# Patient Record
Sex: MALE | Race: WHITE | NOT HISPANIC OR LATINO | Employment: PART TIME | URBAN - METROPOLITAN AREA
[De-identification: names, ages, dates, MRNs, and addresses within clinical notes are randomized per-mention and may not be internally consistent; named-entity substitution may affect disease eponyms.]

---

## 2018-09-13 ENCOUNTER — OFFICE VISIT (OUTPATIENT)
Dept: INTERNAL MEDICINE | Facility: CLINIC | Age: 73
End: 2018-09-13

## 2018-09-13 DIAGNOSIS — I10 ESSENTIAL HYPERTENSION: ICD-10-CM

## 2018-09-13 DIAGNOSIS — Z23 NEED FOR INFLUENZA VACCINATION: Primary | ICD-10-CM

## 2018-09-13 PROCEDURE — 90662 IIV NO PRSV INCREASED AG IM: CPT | Performed by: NURSE PRACTITIONER

## 2018-09-13 PROCEDURE — G0008 ADMIN INFLUENZA VIRUS VAC: HCPCS | Performed by: NURSE PRACTITIONER

## 2018-09-13 PROCEDURE — 93000 ELECTROCARDIOGRAM COMPLETE: CPT | Performed by: NURSE PRACTITIONER

## 2018-09-13 PROCEDURE — 99203 OFFICE O/P NEW LOW 30 MIN: CPT | Performed by: NURSE PRACTITIONER

## 2018-09-13 RX ORDER — FLUTICASONE PROPIONATE 50 MCG
1 SPRAY, SUSPENSION (ML) NASAL AS NEEDED
COMMUNITY

## 2018-09-13 RX ORDER — LANOLIN ALCOHOL/MO/W.PET/CERES
1000 CREAM (GRAM) TOPICAL DAILY
COMMUNITY

## 2018-09-13 RX ORDER — MELATONIN
1000 DAILY
COMMUNITY
End: 2022-10-10

## 2018-09-13 RX ORDER — AMLODIPINE AND VALSARTAN 10; 320 MG/1; MG/1
1 TABLET ORAL DAILY
COMMUNITY
Start: 2018-08-21 | End: 2019-03-18 | Stop reason: SDUPTHER

## 2018-09-13 RX ORDER — DESONIDE 0.5 MG/G
1 OINTMENT TOPICAL AS NEEDED
COMMUNITY
End: 2022-04-11

## 2018-09-13 RX ORDER — FEXOFENADINE HCL 180 MG/1
180 TABLET ORAL AS NEEDED
COMMUNITY
End: 2019-03-18

## 2018-09-13 RX ORDER — ASPIRIN 81 MG/1
81 TABLET ORAL DAILY
COMMUNITY
End: 2019-03-18

## 2018-09-13 NOTE — PROGRESS NOTES
Subjective   Santy Burns is a 73 y.o. male. Patient is here today for   Chief Complaint   Patient presents with   • Hypertension     Pt here to follow up on HTN. Pt currently taking Amlodipine-Valsartan 10-320mg once daily x15 years.    • Immunizations     Pt requesting 65+ flu vaccine.    .    History of Present Illness   New patient here to establish care.  Health history and questionnaire have been reviewed in its entirety. The patient's previous primary care provider was Dr. Colon.   Patient is here to follow up on hypertension which is controlled with current medications. Denies chest pain, headaches. He does check his blood at home and it is usually 110-120s systolic. His previous PCP recently increased the dosage. He denies any problems with the medication.     Patient is requesting a flu shot today.    The following portions of the patient's history were reviewed and updated as appropriate: allergies, current medications, past family history, past medical history, past social history, past surgical history and problem list.    Review of Systems   Constitutional: Negative.    Respiratory: Negative.    Cardiovascular: Negative.    Psychiatric/Behavioral: The patient is nervous/anxious.        Objective   Vitals:    09/13/18 1100   BP: 130/90   Pulse:    SpO2:      Physical Exam   Constitutional: Vital signs are normal. He appears well-developed and well-nourished.   Cardiovascular: Normal rate, regular rhythm and normal heart sounds.    Pulmonary/Chest: Effort normal and breath sounds normal.   Skin: Skin is warm, dry and intact.   Psychiatric: He has a normal mood and affect. His speech is normal and behavior is normal. Thought content normal.   Nursing note and vitals reviewed.      ECG 12 Lead  Date/Time: 9/13/2018 11:00 AM  Performed by: PADDY WEBSTER  Authorized by: PADDY WEBSTER   Comparison: not compared with previous ECG   Previous ECG: no previous ECG available  Rhythm: sinus rhythm  Rate:  normal  BPM: 70  Conduction: conduction normal  ST Segments: ST segments normal  T Waves: T waves normal  QRS axis: normal  Other: no other findings  Clinical impression: normal ECG  Comments: OK interval 182 ms  QRS interval 92 ms  QTc interval 421 ms              Assessment/Plan   Santy was seen today for hypertension and immunizations.    Diagnoses and all orders for this visit:    Need for influenza vaccination  -     Flu Vaccine High Dose PF 65YR+ ([id number])    Essential hypertension  -     ECG 12 Lead    HTN - Alexander will continue on his current medication.  He did just have lab work done in August.  Results will be scanned in the patient's chart.  Follow up in 6 months for fasting labs and a follow up appointment.

## 2018-09-14 VITALS
DIASTOLIC BLOOD PRESSURE: 90 MMHG | WEIGHT: 145.5 LBS | HEART RATE: 76 BPM | SYSTOLIC BLOOD PRESSURE: 130 MMHG | BODY MASS INDEX: 22.84 KG/M2 | OXYGEN SATURATION: 99 % | HEIGHT: 67 IN

## 2018-11-01 ENCOUNTER — OFFICE VISIT (OUTPATIENT)
Dept: INTERNAL MEDICINE | Facility: CLINIC | Age: 73
End: 2018-11-01

## 2018-11-01 VITALS
HEIGHT: 67 IN | DIASTOLIC BLOOD PRESSURE: 64 MMHG | OXYGEN SATURATION: 99 % | SYSTOLIC BLOOD PRESSURE: 168 MMHG | HEART RATE: 67 BPM | WEIGHT: 145.38 LBS | BODY MASS INDEX: 22.82 KG/M2

## 2018-11-01 DIAGNOSIS — F41.9 ANXIETY: ICD-10-CM

## 2018-11-01 DIAGNOSIS — I10 ESSENTIAL HYPERTENSION: Primary | ICD-10-CM

## 2018-11-01 DIAGNOSIS — Z23 NEED FOR VACCINATION WITH 13-POLYVALENT PNEUMOCOCCAL CONJUGATE VACCINE: ICD-10-CM

## 2018-11-01 PROCEDURE — 90670 PCV13 VACCINE IM: CPT | Performed by: NURSE PRACTITIONER

## 2018-11-01 PROCEDURE — 90471 IMMUNIZATION ADMIN: CPT | Performed by: NURSE PRACTITIONER

## 2018-11-01 PROCEDURE — 99213 OFFICE O/P EST LOW 20 MIN: CPT | Performed by: NURSE PRACTITIONER

## 2018-11-01 NOTE — PROGRESS NOTES
Subjective   Santy Burns is a 73 y.o. male. Patient is here today for   Chief Complaint   Patient presents with   • Hypertension     Pt here to discuss HTN. Pt believes his BP readings have been running in the 150's.    .    History of Present Illness   Patient is here to follow up on his blood pressure. He wanted to make sure that he was taking his blood pressure correctly at home. For the most part, he is getting 120s/50s, but it has occasionally been as high as 150s systolic.   He is seeing his therapist and working on his anxiety, especially his anxiety that revolves around his blood pressure.     The following portions of the patient's history were reviewed and updated as appropriate: allergies, current medications, past family history, past medical history, past social history, past surgical history and problem list.    Review of Systems   Constitutional: Negative.    Respiratory: Negative.    Cardiovascular: Negative.    Psychiatric/Behavioral: The patient is nervous/anxious.        Objective   Vitals:    11/01/18 0814   BP: 168/64   Pulse: 67   SpO2: 99%     Physical Exam   Constitutional: Vital signs are normal. He appears well-developed and well-nourished.   Cardiovascular: Normal rate, regular rhythm and normal heart sounds.    No peripheral edema   Pulmonary/Chest: Effort normal and breath sounds normal.   Skin: Skin is warm, dry and intact.   Psychiatric: He has a normal mood and affect. His speech is normal and behavior is normal. Thought content normal.   Nursing note and vitals reviewed.      Assessment/Plan   Santy was seen today for hypertension.    Diagnoses and all orders for this visit:    Essential hypertension    Anxiety    Need for vaccination with 13-polyvalent pneumococcal conjugate vaccine  -     Pneumococcal Conjugate Vaccine 13-Valent All (PCV13)    Continue with current medications. Patient has a follow up appointment scheduled in March.

## 2018-12-12 ENCOUNTER — CLINICAL SUPPORT (OUTPATIENT)
Dept: INTERNAL MEDICINE | Facility: CLINIC | Age: 73
End: 2018-12-12

## 2018-12-12 DIAGNOSIS — Z23 NEED FOR HEPATITIS A VACCINATION: Primary | ICD-10-CM

## 2018-12-12 PROCEDURE — 90471 IMMUNIZATION ADMIN: CPT | Performed by: NURSE PRACTITIONER

## 2018-12-12 PROCEDURE — 90632 HEPA VACCINE ADULT IM: CPT | Performed by: NURSE PRACTITIONER

## 2019-02-05 ENCOUNTER — APPOINTMENT (OUTPATIENT)
Dept: CT IMAGING | Facility: HOSPITAL | Age: 74
End: 2019-02-05

## 2019-02-05 ENCOUNTER — HOSPITAL ENCOUNTER (EMERGENCY)
Facility: HOSPITAL | Age: 74
Discharge: HOME | End: 2019-02-05
Attending: EMERGENCY MEDICINE | Admitting: EMERGENCY MEDICINE

## 2019-02-05 VITALS
HEART RATE: 92 BPM | BODY MASS INDEX: 22.6 KG/M2 | RESPIRATION RATE: 14 BRPM | DIASTOLIC BLOOD PRESSURE: 71 MMHG | OXYGEN SATURATION: 97 % | HEIGHT: 67 IN | TEMPERATURE: 99.6 F | SYSTOLIC BLOOD PRESSURE: 126 MMHG | WEIGHT: 144 LBS

## 2019-02-05 DIAGNOSIS — N20.0 RIGHT KIDNEY STONE: Primary | ICD-10-CM

## 2019-02-05 LAB
ALBUMIN SERPL-MCNC: 4.4 G/DL (ref 3.5–5.2)
ALBUMIN/GLOB SERPL: 1.7 G/DL
ALP SERPL-CCNC: 80 U/L (ref 39–117)
ALT SERPL W P-5'-P-CCNC: 23 U/L (ref 1–41)
ANION GAP SERPL CALCULATED.3IONS-SCNC: 14.5 MMOL/L
AST SERPL-CCNC: 22 U/L (ref 1–40)
BACTERIA UR QL AUTO: ABNORMAL /HPF
BASOPHILS # BLD AUTO: 0.01 10*3/MM3 (ref 0–0.2)
BASOPHILS NFR BLD AUTO: 0.1 % (ref 0–1.5)
BILIRUB SERPL-MCNC: 0.4 MG/DL (ref 0.1–1.2)
BILIRUB UR QL STRIP: NEGATIVE
BUN BLD-MCNC: 35 MG/DL (ref 8–23)
BUN/CREAT SERPL: 27.3 (ref 7–25)
CALCIUM SPEC-SCNC: 9.6 MG/DL (ref 8.6–10.5)
CHLORIDE SERPL-SCNC: 106 MMOL/L (ref 98–107)
CLARITY UR: CLEAR
CO2 SERPL-SCNC: 21.5 MMOL/L (ref 22–29)
COLOR UR: YELLOW
CREAT BLD-MCNC: 1.28 MG/DL (ref 0.76–1.27)
DEPRECATED RDW RBC AUTO: 43.8 FL (ref 37–54)
EOSINOPHIL # BLD AUTO: 0.01 10*3/MM3 (ref 0–0.7)
EOSINOPHIL NFR BLD AUTO: 0.1 % (ref 0.3–6.2)
ERYTHROCYTE [DISTWIDTH] IN BLOOD BY AUTOMATED COUNT: 12.7 % (ref 11.5–14.5)
GFR SERPL CREATININE-BSD FRML MDRD: 55 ML/MIN/1.73
GLOBULIN UR ELPH-MCNC: 2.6 GM/DL
GLUCOSE BLD-MCNC: 168 MG/DL (ref 65–99)
GLUCOSE UR STRIP-MCNC: NEGATIVE MG/DL
HCT VFR BLD AUTO: 41.5 % (ref 40.4–52.2)
HGB BLD-MCNC: 13.8 G/DL (ref 13.7–17.6)
HGB UR QL STRIP.AUTO: ABNORMAL
HYALINE CASTS UR QL AUTO: ABNORMAL /LPF
IMM GRANULOCYTES # BLD AUTO: 0 10*3/MM3 (ref 0–0.03)
IMM GRANULOCYTES NFR BLD AUTO: 0 % (ref 0–0.5)
KETONES UR QL STRIP: NEGATIVE
LEUKOCYTE ESTERASE UR QL STRIP.AUTO: NEGATIVE
LIPASE SERPL-CCNC: 23 U/L (ref 13–60)
LYMPHOCYTES # BLD AUTO: 0.61 10*3/MM3 (ref 0.9–4.8)
LYMPHOCYTES NFR BLD AUTO: 6.4 % (ref 19.6–45.3)
MCH RBC QN AUTO: 31.4 PG (ref 27–32.7)
MCHC RBC AUTO-ENTMCNC: 33.3 G/DL (ref 32.6–36.4)
MCV RBC AUTO: 94.3 FL (ref 79.8–96.2)
MONOCYTES # BLD AUTO: 0.7 10*3/MM3 (ref 0.2–1.2)
MONOCYTES NFR BLD AUTO: 7.3 % (ref 5–12)
NEUTROPHILS # BLD AUTO: 8.26 10*3/MM3 (ref 1.9–8.1)
NEUTROPHILS NFR BLD AUTO: 86.1 % (ref 42.7–76)
NITRITE UR QL STRIP: NEGATIVE
PH UR STRIP.AUTO: <=5 [PH] (ref 5–8)
PLATELET # BLD AUTO: 198 10*3/MM3 (ref 140–500)
PMV BLD AUTO: 10 FL (ref 6–12)
POTASSIUM BLD-SCNC: 4.6 MMOL/L (ref 3.5–5.2)
PROT SERPL-MCNC: 7 G/DL (ref 6–8.5)
PROT UR QL STRIP: NEGATIVE
RBC # BLD AUTO: 4.4 10*6/MM3 (ref 4.6–6)
RBC # UR: ABNORMAL /HPF
REF LAB TEST METHOD: ABNORMAL
SODIUM BLD-SCNC: 142 MMOL/L (ref 136–145)
SP GR UR STRIP: 1.02 (ref 1–1.03)
SQUAMOUS #/AREA URNS HPF: ABNORMAL /HPF
UROBILINOGEN UR QL STRIP: ABNORMAL
WBC NRBC COR # BLD: 9.59 10*3/MM3 (ref 4.5–10.7)
WBC UR QL AUTO: ABNORMAL /HPF

## 2019-02-05 PROCEDURE — 83690 ASSAY OF LIPASE: CPT | Performed by: EMERGENCY MEDICINE

## 2019-02-05 PROCEDURE — 96374 THER/PROPH/DIAG INJ IV PUSH: CPT

## 2019-02-05 PROCEDURE — 96375 TX/PRO/DX INJ NEW DRUG ADDON: CPT

## 2019-02-05 PROCEDURE — 85025 COMPLETE CBC W/AUTO DIFF WBC: CPT | Performed by: EMERGENCY MEDICINE

## 2019-02-05 PROCEDURE — 99284 EMERGENCY DEPT VISIT MOD MDM: CPT

## 2019-02-05 PROCEDURE — 81001 URINALYSIS AUTO W/SCOPE: CPT | Performed by: EMERGENCY MEDICINE

## 2019-02-05 PROCEDURE — 74176 CT ABD & PELVIS W/O CONTRAST: CPT

## 2019-02-05 PROCEDURE — 25010000002 KETOROLAC TROMETHAMINE PER 15 MG: Performed by: EMERGENCY MEDICINE

## 2019-02-05 PROCEDURE — 80053 COMPREHEN METABOLIC PANEL: CPT | Performed by: EMERGENCY MEDICINE

## 2019-02-05 PROCEDURE — 25010000002 ONDANSETRON PER 1 MG: Performed by: EMERGENCY MEDICINE

## 2019-02-05 RX ORDER — TAMSULOSIN HYDROCHLORIDE 0.4 MG/1
1 CAPSULE ORAL DAILY
Qty: 5 CAPSULE | Refills: 0 | Status: SHIPPED | OUTPATIENT
Start: 2019-02-05 | End: 2019-03-18

## 2019-02-05 RX ORDER — KETOROLAC TROMETHAMINE 15 MG/ML
15 INJECTION, SOLUTION INTRAMUSCULAR; INTRAVENOUS ONCE
Status: COMPLETED | OUTPATIENT
Start: 2019-02-05 | End: 2019-02-05

## 2019-02-05 RX ORDER — ONDANSETRON 2 MG/ML
4 INJECTION INTRAMUSCULAR; INTRAVENOUS ONCE
Status: COMPLETED | OUTPATIENT
Start: 2019-02-05 | End: 2019-02-05

## 2019-02-05 RX ORDER — OXYCODONE HYDROCHLORIDE AND ACETAMINOPHEN 5; 325 MG/1; MG/1
1 TABLET ORAL EVERY 6 HOURS PRN
Qty: 20 TABLET | Refills: 0 | Status: SHIPPED | OUTPATIENT
Start: 2019-02-05 | End: 2019-03-18

## 2019-02-05 RX ADMIN — ONDANSETRON 4 MG: 2 INJECTION INTRAMUSCULAR; INTRAVENOUS at 08:13

## 2019-02-05 RX ADMIN — SODIUM CHLORIDE 1000 ML: 9 INJECTION, SOLUTION INTRAVENOUS at 08:13

## 2019-02-05 RX ADMIN — KETOROLAC TROMETHAMINE 15 MG: 15 INJECTION, SOLUTION INTRAMUSCULAR; INTRAVENOUS at 08:13

## 2019-02-05 NOTE — ED PROVIDER NOTES
" EMERGENCY DEPARTMENT ENCOUNTER    Room Number:  10/10  PCP: Jordana Goss APRN   Urologist: Dr. Villeda  Historian: Patient, Family      HPI  Chief Complaint: Flank Pain  Context: Santy Burns is a 74 y.o. male with h/o nephrolithiasis who presents to the ED c/o constant right flank pain radiating to the right testicle and RLQ of the abdomen onset last night. Pt reports that his flank pain was abrupt in onset. Pt reports that he has been unable to find a position of comfort. Pt states that he has also had nausea and vomiting, but denies diarrhea, chest pain, dyspnea, dysuria, gross hematuria, penile/scrotal swelling, and documented fever. Pt reports that his current flank pain is similar to his pain with his prior ureteral stones. There are no other complaints at this time.        Pain Location: Right flank   Radiation: Right testicle, RLQ of the abdomen  Character: \"aching\"  Duration: Onset last night  Severity: Moderate  Progression: Waxing and waning  Aggravating Factors: Nothing  Alleviating Factors: Nothing        PAST MEDICAL HISTORY  Active Ambulatory Problems     Diagnosis Date Noted   • Hypertension    • Anxiety    • Colon polyp    • Kidney stones      Resolved Ambulatory Problems     Diagnosis Date Noted   • No Resolved Ambulatory Problems     Past Medical History:   Diagnosis Date   • Anxiety    • Colon polyp    • Hypertension    • Kidney stones          PAST SURGICAL HISTORY  Past Surgical History:   Procedure Laterality Date   • MENISCECTOMY      1964   • SINUS SURGERY           FAMILY HISTORY  Family History   Problem Relation Age of Onset   • Hypertension Mother    • Kidney cancer Mother    • Heart attack Father    • Hypertension Father    • No Known Problems Brother          SOCIAL HISTORY  Social History     Socioeconomic History   • Marital status:      Spouse name: Not on file   • Number of children: Not on file   • Years of education: Not on file   • Highest education level: Not on " file   Social Needs   • Financial resource strain: Not on file   • Food insecurity - worry: Not on file   • Food insecurity - inability: Not on file   • Transportation needs - medical: Not on file   • Transportation needs - non-medical: Not on file   Occupational History   • Not on file   Tobacco Use   • Smoking status: Never Smoker   • Smokeless tobacco: Never Used   Substance and Sexual Activity   • Alcohol use: Yes   • Drug use: No   • Sexual activity: Not on file   Other Topics Concern   • Not on file   Social History Narrative   • Not on file         ALLERGIES  Patient has no known allergies.        REVIEW OF SYSTEMS  Review of Systems   Constitutional: Negative for chills and fever.   HENT: Negative for congestion, rhinorrhea and sore throat.    Eyes: Negative for pain.   Respiratory: Negative for cough and shortness of breath.    Cardiovascular: Negative for chest pain, palpitations and leg swelling.   Gastrointestinal: Positive for abdominal pain (associated with flank pain), nausea and vomiting. Negative for diarrhea.   Genitourinary: Positive for flank pain (right-sided; radiating to the testicle and abdomen) and testicular pain (associated with flank pain). Negative for difficulty urinating, dysuria, frequency, hematuria, penile swelling and scrotal swelling.   Musculoskeletal: Negative for myalgias, neck pain and neck stiffness.   Skin: Negative for rash.   Neurological: Negative for dizziness, speech difficulty, weakness, light-headedness, numbness and headaches.   Psychiatric/Behavioral: Negative.    All other systems reviewed and are negative.           PHYSICAL EXAM  ED Triage Vitals   Temp Heart Rate Resp BP SpO2   02/05/19 0752 02/05/19 0752 02/05/19 0752 02/05/19 0801 02/05/19 0752   99.6 °F (37.6 °C) 107 16 168/80 99 %      Temp src Heart Rate Source Patient Position BP Location FiO2 (%)   -- 02/05/19 0801 02/05/19 0801 02/05/19 0801 --    Monitor Lying Right arm          Physical Exam    Constitutional: He is oriented to person, place, and time. No distress.   HENT:   Head: Normocephalic.   Mouth/Throat: Mucous membranes are normal.   Eyes: EOM are normal. Pupils are equal, round, and reactive to light.   Neck: Normal range of motion. Neck supple.   Cardiovascular: Normal rate, regular rhythm and normal heart sounds.   Pulmonary/Chest: Effort normal and breath sounds normal. No respiratory distress. He has no decreased breath sounds. He has no wheezes. He has no rhonchi. He has no rales.   Abdominal: Soft. There is no tenderness. There is no rebound, no guarding and no CVA tenderness.   Musculoskeletal: Normal range of motion.   Neurological: He is alert and oriented to person, place, and time. He has normal sensation.   Skin: Skin is warm and dry.   Psychiatric: Mood and affect normal.   Nursing note and vitals reviewed.          LAB RESULTS  Recent Results (from the past 24 hour(s))   Comprehensive Metabolic Panel    Collection Time: 02/05/19  8:09 AM   Result Value Ref Range    Glucose 168 (H) 65 - 99 mg/dL    BUN 35 (H) 8 - 23 mg/dL    Creatinine 1.28 (H) 0.76 - 1.27 mg/dL    Sodium 142 136 - 145 mmol/L    Potassium 4.6 3.5 - 5.2 mmol/L    Chloride 106 98 - 107 mmol/L    CO2 21.5 (L) 22.0 - 29.0 mmol/L    Calcium 9.6 8.6 - 10.5 mg/dL    Total Protein 7.0 6.0 - 8.5 g/dL    Albumin 4.40 3.50 - 5.20 g/dL    ALT (SGPT) 23 1 - 41 U/L    AST (SGOT) 22 1 - 40 U/L    Alkaline Phosphatase 80 39 - 117 U/L    Total Bilirubin 0.4 0.1 - 1.2 mg/dL    eGFR Non African Amer 55 (L) >60 mL/min/1.73    Globulin 2.6 gm/dL    A/G Ratio 1.7 g/dL    BUN/Creatinine Ratio 27.3 (H) 7.0 - 25.0    Anion Gap 14.5 mmol/L   Urinalysis With Microscopic If Indicated (No Culture) - Urine, Clean Catch    Collection Time: 02/05/19  8:09 AM   Result Value Ref Range    Color, UA Yellow Yellow, Straw    Appearance, UA Clear Clear    pH, UA <=5.0 5.0 - 8.0    Specific Gravity, UA 1.024 1.005 - 1.030    Glucose, UA Negative Negative     Ketones, UA Negative Negative    Bilirubin, UA Negative Negative    Blood, UA Large (3+) (A) Negative    Protein, UA Negative Negative    Leuk Esterase, UA Negative Negative    Nitrite, UA Negative Negative    Urobilinogen, UA 0.2 E.U./dL 0.2 - 1.0 E.U./dL   Lipase    Collection Time: 02/05/19  8:09 AM   Result Value Ref Range    Lipase 23 13 - 60 U/L   CBC Auto Differential    Collection Time: 02/05/19  8:09 AM   Result Value Ref Range    WBC 9.59 4.50 - 10.70 10*3/mm3    RBC 4.40 (L) 4.60 - 6.00 10*6/mm3    Hemoglobin 13.8 13.7 - 17.6 g/dL    Hematocrit 41.5 40.4 - 52.2 %    MCV 94.3 79.8 - 96.2 fL    MCH 31.4 27.0 - 32.7 pg    MCHC 33.3 32.6 - 36.4 g/dL    RDW 12.7 11.5 - 14.5 %    RDW-SD 43.8 37.0 - 54.0 fl    MPV 10.0 6.0 - 12.0 fL    Platelets 198 140 - 500 10*3/mm3    Neutrophil % 86.1 (H) 42.7 - 76.0 %    Lymphocyte % 6.4 (L) 19.6 - 45.3 %    Monocyte % 7.3 5.0 - 12.0 %    Eosinophil % 0.1 (L) 0.3 - 6.2 %    Basophil % 0.1 0.0 - 1.5 %    Immature Grans % 0.0 0.0 - 0.5 %    Neutrophils, Absolute 8.26 (H) 1.90 - 8.10 10*3/mm3    Lymphocytes, Absolute 0.61 (L) 0.90 - 4.80 10*3/mm3    Monocytes, Absolute 0.70 0.20 - 1.20 10*3/mm3    Eosinophils, Absolute 0.01 0.00 - 0.70 10*3/mm3    Basophils, Absolute 0.01 0.00 - 0.20 10*3/mm3    Immature Grans, Absolute 0.00 0.00 - 0.03 10*3/mm3   Urinalysis, Microscopic Only - Urine, Clean Catch    Collection Time: 02/05/19  8:09 AM   Result Value Ref Range    RBC, UA Too Numerous to Count (A) None Seen, 0-2 /HPF    WBC, UA 0-2 None Seen, 0-2 /HPF    Bacteria, UA None Seen None Seen /HPF    Squamous Epithelial Cells, UA 0-2 None Seen, 0-2 /HPF    Hyaline Casts, UA 0-2 None Seen /LPF    Methodology Automated Microscopy        Ordered the above labs and reviewed the results.        RADIOLOGY  CT Abdomen Pelvis Without Contrast   Preliminary Result   There is a 6 mm stone within the proximal right ureter   approximately 5 cm distal to the UPJ with moderately large    hydroureteronephrosis.       Discussed with Dr. Sarmiento.               Ordered the above noted radiological studies. Reviewed by me in PACS.  Spoke with Dr. Snyder (radiologist) regarding CT Abd scan results.          PROCEDURES  Procedures          MEDICATIONS GIVEN IN ER  Medications   sodium chloride 0.9 % bolus 1,000 mL (0 mL Intravenous Stopped 2/5/19 1015)   ketorolac (TORADOL) injection 15 mg (15 mg Intravenous Given 2/5/19 0813)   ondansetron (ZOFRAN) injection 4 mg (4 mg Intravenous Given 2/5/19 0813)             PROGRESS AND CONSULTS  ED Course as of Feb 05 1055   Tue Feb 05, 2019   1020 10:20 AM  Patient with right flank pain and appears to have 6mm stone.  Moderate hydro.  Discussed with Dr. Jones who will see in the office now.  Urine strainer.  Percocet, flomax.  Has been given fluids for elevated BUN.  [SL]      ED Course User Index  [SL] Jose Sarmiento MD       8:05 AM:  UA and blood work ordered for further evaluation. Toradol and Zofran ordered to treat for flank pain and nausea. IV fluids ordered to hydrate pt.     8:54 AM:  CT Abd ordered for further evaluation.     9:32 AM:  Rechecked pt. Informed pt that his CT Abd shows a 6 mm stone within the proximal right ureter about 5 cm distal to the UPJ. Pt's creatinine is 1.28. Will discuss further course of care with the urologist. Pt agrees with plan.      9:34 AM:  Placed call to the urologist to discuss further course of care.     10:10 AM:  Discussed the case with Dr. Jones, urologist. He would like pt discharged from the ER. He will f/u with pt in his office immediately afterwards.     10:13 AM:  Rechecked pt. Informed pt of my d/w Dr. Jones, urologist on-call for Dr. Villeda, who will f/u with pt immediately after pt is discharged from the ER today -> Pt was strongly advised to f/u as scheduled. Pt will be provided with a urine strainer. Pt will be prescribed with rx for small amount of pain medicine for discomfort and  Flomax. Strict RTER warnings given. Pt agrees with plan for discharge.     Pt's ALFREDO was queried - It is negative.      MEDICAL DECISION MAKING      MDM  Number of Diagnoses or Management Options     Amount and/or Complexity of Data Reviewed  Clinical lab tests: ordered and reviewed (UA results reviewed.)  Tests in the radiology section of CPT®: ordered and reviewed (CT Abd:  There is a 6 mm stone within the proximal right ureter  approximately 5 cm distal to the UPJ with moderately large  hydroureteronephrosis.)  Discussion of test results with the performing providers: yes (CT Abd results d/w radiologist.   )  Discuss the patient with other providers: yes (Discussed the case with Dr. Jones, urologist.  )    Patient Progress  Patient progress: stable             DIAGNOSIS  Final diagnoses:   Right kidney stone           DISPOSITION  Pt discharged.    DISCHARGE    Patient discharged in stable condition.    Reviewed implications of results, diagnosis, meds, responsibility to follow up, warning signs and symptoms of possible worsening, potential complications and reasons to return to ER.    Patient/Family voiced understanding of above instructions.    Discussed plan for discharge, as there is no emergent indication for admission. Pt/family is agreeable and understands need for follow up and repeat testing. Pt is aware that discharge does not mean that nothing is wrong but it indicates no emergency is present that requires admission and they must continue care with follow-up as given below or physician of their choice.     FOLLOW-UP  Yash Jones MD  [address] [place] [address] [place] [id number] [phone]    Go today           Medication List      New Prescriptions    oxyCODONE-acetaminophen 5-325 MG per tablet  Commonly known as:  PERCOCET  Take 1 tablet by mouth Every 6 (Six) Hours As Needed for Moderate Pain .     tamsulosin 0.4 MG capsule 24 hr capsule  Commonly known as:  FLOMAX  Take 1  capsule by mouth Daily.            Latest Documented Vital Signs:  As of 10:38 AM  BP- 126/71 HR- 92 Temp- 99.6 °F (37.6 °C) O2 sat- 97%        --  Documentation assistance provided by muriel Serrano for Dr. Guillermina MD.  Information recorded by the scribe was done at my direction and has been verified and validated by me.             Jeremy Serrano  02/05/19 1055       Jose Sarmiento MD  02/05/19 1400

## 2019-02-05 NOTE — ED TRIAGE NOTES
Pt c/o nausea, right testicle and right flank pain onset last night and two episodes of vomiting. Pt states he currently has a stone in kidney waiting to pass. Pt reports hx of kidney stones. Denies hematuria, denies testicular swelling. Urologist-Dr. Villeda

## 2019-03-11 DIAGNOSIS — I10 ESSENTIAL HYPERTENSION: ICD-10-CM

## 2019-03-11 DIAGNOSIS — Z00.00 HEALTH CARE MAINTENANCE: ICD-10-CM

## 2019-03-11 DIAGNOSIS — R35.1 NOCTURIA: Primary | ICD-10-CM

## 2019-03-13 LAB
ALBUMIN SERPL-MCNC: 4.4 G/DL (ref 3.5–5.2)
ALBUMIN/GLOB SERPL: 2.1 G/DL
ALP SERPL-CCNC: 77 U/L (ref 39–117)
ALT SERPL-CCNC: 14 U/L (ref 1–41)
AST SERPL-CCNC: 16 U/L (ref 1–40)
BASOPHILS # BLD AUTO: 0.04 10*3/MM3 (ref 0–0.2)
BASOPHILS NFR BLD AUTO: 0.8 % (ref 0–1.5)
BILIRUB SERPL-MCNC: 0.5 MG/DL (ref 0.1–1.2)
BUN SERPL-MCNC: 17 MG/DL (ref 8–23)
BUN/CREAT SERPL: 18.1 (ref 7–25)
CALCIUM SERPL-MCNC: 10 MG/DL (ref 8.6–10.5)
CHLORIDE SERPL-SCNC: 106 MMOL/L (ref 98–107)
CHOLEST SERPL-MCNC: 191 MG/DL (ref 0–200)
CHOLEST/HDLC SERPL: 2.69 {RATIO}
CO2 SERPL-SCNC: 27.7 MMOL/L (ref 22–29)
CREAT SERPL-MCNC: 0.94 MG/DL (ref 0.76–1.27)
EOSINOPHIL # BLD AUTO: 0.2 10*3/MM3 (ref 0–0.4)
EOSINOPHIL NFR BLD AUTO: 3.9 % (ref 0.3–6.2)
ERYTHROCYTE [DISTWIDTH] IN BLOOD BY AUTOMATED COUNT: 13.2 % (ref 12.3–15.4)
GLOBULIN SER CALC-MCNC: 2.1 GM/DL
GLUCOSE SERPL-MCNC: 103 MG/DL (ref 65–99)
HCT VFR BLD AUTO: 43.4 % (ref 37.5–51)
HDLC SERPL-MCNC: 71 MG/DL (ref 40–60)
HGB BLD-MCNC: 13.6 G/DL (ref 13–17.7)
IMM GRANULOCYTES # BLD AUTO: 0.04 10*3/MM3 (ref 0–0.05)
IMM GRANULOCYTES NFR BLD AUTO: 0.8 % (ref 0–0.5)
LDLC SERPL CALC-MCNC: 102 MG/DL (ref 0–100)
LYMPHOCYTES # BLD AUTO: 1.48 10*3/MM3 (ref 0.7–3.1)
LYMPHOCYTES NFR BLD AUTO: 29 % (ref 19.6–45.3)
MCH RBC QN AUTO: 30.3 PG (ref 26.6–33)
MCHC RBC AUTO-ENTMCNC: 31.3 G/DL (ref 31.5–35.7)
MCV RBC AUTO: 96.7 FL (ref 79–97)
MONOCYTES # BLD AUTO: 0.52 10*3/MM3 (ref 0.1–0.9)
MONOCYTES NFR BLD AUTO: 10.2 % (ref 5–12)
NEUTROPHILS # BLD AUTO: 2.82 10*3/MM3 (ref 1.4–7)
NEUTROPHILS NFR BLD AUTO: 55.3 % (ref 42.7–76)
NRBC BLD AUTO-RTO: 0 /100 WBC (ref 0–0)
PLATELET # BLD AUTO: 180 10*3/MM3 (ref 140–450)
POTASSIUM SERPL-SCNC: 4.5 MMOL/L (ref 3.5–5.2)
PROT SERPL-MCNC: 6.5 G/DL (ref 6–8.5)
PSA SERPL-MCNC: 3.72 NG/ML (ref 0–4)
RBC # BLD AUTO: 4.49 10*6/MM3 (ref 4.14–5.8)
SODIUM SERPL-SCNC: 143 MMOL/L (ref 136–145)
TRIGL SERPL-MCNC: 90 MG/DL (ref 0–150)
VLDLC SERPL CALC-MCNC: 18 MG/DL (ref 5–40)
WBC # BLD AUTO: 5.1 10*3/MM3 (ref 3.4–10.8)

## 2019-03-14 LAB
HBA1C MFR BLD: 5.52 % (ref 4.8–5.6)
Lab: NORMAL
WRITTEN AUTHORIZATION: NORMAL

## 2019-03-18 ENCOUNTER — OFFICE VISIT (OUTPATIENT)
Dept: INTERNAL MEDICINE | Facility: CLINIC | Age: 74
End: 2019-03-18

## 2019-03-18 VITALS
BODY MASS INDEX: 22.81 KG/M2 | HEART RATE: 62 BPM | OXYGEN SATURATION: 99 % | DIASTOLIC BLOOD PRESSURE: 62 MMHG | WEIGHT: 145.31 LBS | SYSTOLIC BLOOD PRESSURE: 152 MMHG | HEIGHT: 67 IN

## 2019-03-18 DIAGNOSIS — I10 ESSENTIAL HYPERTENSION: ICD-10-CM

## 2019-03-18 DIAGNOSIS — Z00.00 HEALTHCARE MAINTENANCE: Primary | ICD-10-CM

## 2019-03-18 DIAGNOSIS — R73.01 ELEVATED FASTING GLUCOSE: ICD-10-CM

## 2019-03-18 PROCEDURE — 99213 OFFICE O/P EST LOW 20 MIN: CPT | Performed by: NURSE PRACTITIONER

## 2019-03-18 PROCEDURE — 99397 PER PM REEVAL EST PAT 65+ YR: CPT | Performed by: NURSE PRACTITIONER

## 2019-03-18 RX ORDER — TRIAMCINOLONE ACETONIDE OINTMENT USP, 0.05% 0.5 MG/G
1 OINTMENT TOPICAL AS NEEDED
COMMUNITY

## 2019-03-18 RX ORDER — AMLODIPINE AND VALSARTAN 10; 320 MG/1; MG/1
1 TABLET ORAL DAILY
Qty: 90 TABLET | Refills: 3 | Status: SHIPPED | OUTPATIENT
Start: 2019-03-18 | End: 2020-02-11 | Stop reason: SDUPTHER

## 2019-04-02 PROBLEM — R73.01 ELEVATED FASTING GLUCOSE: Status: ACTIVE | Noted: 2019-04-02

## 2019-07-19 ENCOUNTER — OFFICE VISIT (OUTPATIENT)
Dept: INTERNAL MEDICINE | Facility: CLINIC | Age: 74
End: 2019-07-19

## 2019-07-19 ENCOUNTER — HOSPITAL ENCOUNTER (OUTPATIENT)
Dept: GENERAL RADIOLOGY | Facility: HOSPITAL | Age: 74
Discharge: HOME | End: 2019-07-19
Admitting: NURSE PRACTITIONER

## 2019-07-19 VITALS
BODY MASS INDEX: 22.84 KG/M2 | OXYGEN SATURATION: 98 % | SYSTOLIC BLOOD PRESSURE: 152 MMHG | HEART RATE: 72 BPM | DIASTOLIC BLOOD PRESSURE: 66 MMHG | WEIGHT: 145.5 LBS | HEIGHT: 67 IN

## 2019-07-19 DIAGNOSIS — M54.50 LUMBAR BACK PAIN: ICD-10-CM

## 2019-07-19 DIAGNOSIS — M51.26 LUMBAR HERNIATED DISC: ICD-10-CM

## 2019-07-19 DIAGNOSIS — M54.50 LUMBAR BACK PAIN: Primary | ICD-10-CM

## 2019-07-19 PROCEDURE — 99213 OFFICE O/P EST LOW 20 MIN: CPT | Performed by: NURSE PRACTITIONER

## 2019-07-19 PROCEDURE — 72100 X-RAY EXAM L-S SPINE 2/3 VWS: CPT

## 2019-07-19 NOTE — PROGRESS NOTES
Subjective   Santy Burns is a 74 y.o. male. Patient is here today for   Chief Complaint   Patient presents with   • Back Pain     Pt complains of having very low back pain that radiates to bilateral glutes & right sciatic area x8 weeks.   .    History of Present Illness   C/o low back pain x 8 weeks associated with pain that radiates down his legs. He has done some stretches for sciatica which hasn't helped much.   Walking helps. He has tried advil which has helped some. Denies any injury, but he had been bending over doing yard work when it started.  Deneis any numbness or tingling in his legs.    The following portions of the patient's history were reviewed and updated as appropriate: allergies, current medications, past family history, past medical history, past social history, past surgical history and problem list.    Review of Systems   Constitutional: Negative.    Respiratory: Negative.    Cardiovascular: Negative.    Musculoskeletal: Positive for back pain.       Objective   Vitals:    07/19/19 1103   BP: 152/66   Pulse: 72   SpO2: 98%   Patient has checked his BP at home and it has been 120s/60-70s.     Physical Exam   Constitutional: Vital signs are normal. He appears well-developed and well-nourished.   Cardiovascular: Normal rate, regular rhythm and normal heart sounds.   Pulmonary/Chest: Effort normal and breath sounds normal.   Musculoskeletal:        Lumbar back: He exhibits normal range of motion, no tenderness and no spasm.   Bilateral straight leg raise negative   Skin: Skin is warm, dry and intact.   Psychiatric: He has a normal mood and affect. His speech is normal and behavior is normal. Thought content normal.   Nursing note and vitals reviewed.      Assessment/Plan   Santy was seen today for back pain.    Diagnoses and all orders for this visit:    Lumbar back pain  -     XR Spine Lumbar 2 or 3 View; Future    Patient may benefit from physical therapy.  Will wait for x-ray  results.

## 2019-09-27 ENCOUNTER — OFFICE VISIT (OUTPATIENT)
Dept: INTERNAL MEDICINE | Facility: CLINIC | Age: 74
End: 2019-09-27

## 2019-09-27 VITALS
SYSTOLIC BLOOD PRESSURE: 160 MMHG | HEIGHT: 67 IN | BODY MASS INDEX: 22.6 KG/M2 | WEIGHT: 144 LBS | HEART RATE: 72 BPM | OXYGEN SATURATION: 99 % | DIASTOLIC BLOOD PRESSURE: 68 MMHG

## 2019-09-27 DIAGNOSIS — I10 ESSENTIAL HYPERTENSION: Primary | ICD-10-CM

## 2019-09-27 DIAGNOSIS — Z23 NEED FOR INFLUENZA VACCINATION: ICD-10-CM

## 2019-09-27 PROCEDURE — 90662 IIV NO PRSV INCREASED AG IM: CPT | Performed by: NURSE PRACTITIONER

## 2019-09-27 PROCEDURE — 90471 IMMUNIZATION ADMIN: CPT | Performed by: NURSE PRACTITIONER

## 2019-09-27 PROCEDURE — 99213 OFFICE O/P EST LOW 20 MIN: CPT | Performed by: NURSE PRACTITIONER

## 2019-09-27 NOTE — PROGRESS NOTES
Subjective   Santy Burns is a 74 y.o. male. Patient is here today for   Chief Complaint   Patient presents with   • Hypertension     Pt here to follow up on HTN.    .    History of Present Illness   Patient is here to follow up on hypertension which is controlled with current medications. Denies chest pain, headaches.   He checks it at at home with a manual cuff and it ranges from 118-144/50-60s.  States that he always gets nervous at the doctor's office.    Patient c/o tingling in bilateral forearms that comes and goes.  This is been going on for years.  He was told in the past by previous doctor that could be due to the degenerative disc disease that he has in his neck.  Denies any numbness or tingling in his hands.  Denies any decreased strength in his arms or hands.    The following portions of the patient's history were reviewed and updated as appropriate: allergies, current medications, past family history, past medical history, past social history, past surgical history and problem list.    Review of Systems   Constitutional: Negative.    Respiratory: Negative.    Cardiovascular: Negative.    Psychiatric/Behavioral: The patient is nervous/anxious.        Objective   Vitals:    09/27/19 1118   BP: 160/68   Pulse: 72   SpO2: 99%     Physical Exam   Constitutional: Vital signs are normal. He appears well-developed and well-nourished.   Cardiovascular: Normal rate, regular rhythm and normal heart sounds.   Pulses:       Radial pulses are 2+ on the right side, and 2+ on the left side.   Pulmonary/Chest: Effort normal and breath sounds normal.   Skin: Skin is warm, dry and intact.   Psychiatric: His speech is normal and behavior is normal. Thought content normal. His mood appears anxious (slight tremor in bilateral hands).   Nursing note and vitals reviewed.      Assessment/Plan   Santy was seen today for hypertension.    Diagnoses and all orders for this visit:    Essential hypertension      Current  Outpatient Medications:   •  amLODIPine-valsartan (EXFORGE)  MG per tablet, Take 1 tablet by mouth Daily., Disp: 90 tablet, Rfl: 3  •  Calcium Carb-Cholecalciferol (CALCIUM 1000 + D PO), Take 1 tablet by mouth Daily., Disp: , Rfl:   •  cholecalciferol (VITAMIN D3) 1000 units tablet, Take 1,000 Units by mouth Daily., Disp: , Rfl:   •  desonide (DESOWEN) 0.05 % ointment, Apply 1 application topically to the appropriate area as directed As Needed., Disp: , Rfl:   •  fluticasone (FLONASE) 50 MCG/ACT nasal spray, 1 spray into the nostril(s) as directed by provider As Needed for Rhinitis., Disp: , Rfl:   •  Triamcinolone Acetonide 0.05 % ointment, Apply 1 application topically As Needed., Disp: , Rfl:   •  vitamin B-12 (CYANOCOBALAMIN) 1000 MCG tablet, Take 1,000 mcg by mouth Daily., Disp: , Rfl:       HTN - Patient will continue current medication.  Follow-up in 6 months for physical and fasting labs    Tingling in forearms - continue to monitor for now. May need nerve study in the future if his symptoms worsen

## 2019-12-23 ENCOUNTER — AMBULATORY SURGICAL CENTER (OUTPATIENT)
Dept: URBAN - METROPOLITAN AREA SURGERY 20 | Facility: SURGERY | Age: 74
End: 2019-12-23
Payer: COMMERCIAL

## 2019-12-23 DIAGNOSIS — K64.1 SECOND DEGREE HEMORRHOIDS: ICD-10-CM

## 2019-12-23 DIAGNOSIS — Z12.11 ENCOUNTER FOR SCREENING FOR MALIGNANT NEOPLASM OF COLON: ICD-10-CM

## 2019-12-23 DIAGNOSIS — K57.30 DIVERTICULOSIS OF LARGE INTESTINE WITHOUT PERFORATION OR ABS: ICD-10-CM

## 2019-12-23 PROCEDURE — 45378 DIAGNOSTIC COLONOSCOPY: CPT | Mod: 33 | Performed by: INTERNAL MEDICINE

## 2020-02-11 DIAGNOSIS — I10 ESSENTIAL HYPERTENSION: ICD-10-CM

## 2020-02-11 RX ORDER — AMLODIPINE AND VALSARTAN 10; 320 MG/1; MG/1
1 TABLET ORAL DAILY
Qty: 90 TABLET | Refills: 2 | Status: SHIPPED | OUTPATIENT
Start: 2020-02-11 | End: 2020-12-17 | Stop reason: SDUPTHER

## 2020-03-17 DIAGNOSIS — R35.1 NOCTURIA: ICD-10-CM

## 2020-03-17 DIAGNOSIS — Z00.00 HEALTHCARE MAINTENANCE: ICD-10-CM

## 2020-03-17 DIAGNOSIS — I10 ESSENTIAL HYPERTENSION: Primary | ICD-10-CM

## 2020-11-30 DIAGNOSIS — I10 ESSENTIAL HYPERTENSION: ICD-10-CM

## 2020-11-30 RX ORDER — AMLODIPINE AND VALSARTAN 10; 320 MG/1; MG/1
TABLET ORAL
Qty: 90 TABLET | Refills: 1 | OUTPATIENT
Start: 2020-11-30

## 2020-12-04 ENCOUNTER — TELEPHONE (OUTPATIENT)
Dept: INTERNAL MEDICINE | Facility: CLINIC | Age: 75
End: 2020-12-04

## 2020-12-04 NOTE — TELEPHONE ENCOUNTER
THE PATIENT CALLED IN AND STATED THE A MEDICATION WAS DENIED. HE WOULD LIKE TO KNOW WHY IT WAS DENIED. THE MEDICATION IS amLODIPine-valsartan (EXFORGE)  MG per tablet.    PLEASE ADVISE    CALLBACK NUMBER [phone]

## 2020-12-11 LAB
ALBUMIN SERPL-MCNC: 4.3 G/DL (ref 3.5–5.2)
ALBUMIN/GLOB SERPL: 1.9 G/DL
ALP SERPL-CCNC: 84 U/L (ref 39–117)
ALT SERPL-CCNC: 25 U/L (ref 1–41)
AST SERPL-CCNC: 24 U/L (ref 1–40)
BASOPHILS # BLD AUTO: 0.03 10*3/MM3 (ref 0–0.2)
BASOPHILS NFR BLD AUTO: 0.5 % (ref 0–1.5)
BILIRUB SERPL-MCNC: 0.6 MG/DL (ref 0–1.2)
BUN SERPL-MCNC: 20 MG/DL (ref 8–23)
BUN/CREAT SERPL: 21.1 (ref 7–25)
CALCIUM SERPL-MCNC: 9.3 MG/DL (ref 8.6–10.5)
CHLORIDE SERPL-SCNC: 100 MMOL/L (ref 98–107)
CHOLEST SERPL-MCNC: 193 MG/DL (ref 0–200)
CHOLEST/HDLC SERPL: 2.54 {RATIO}
CO2 SERPL-SCNC: 28.9 MMOL/L (ref 22–29)
CREAT SERPL-MCNC: 0.95 MG/DL (ref 0.76–1.27)
EOSINOPHIL # BLD AUTO: 0.23 10*3/MM3 (ref 0–0.4)
EOSINOPHIL NFR BLD AUTO: 4.2 % (ref 0.3–6.2)
ERYTHROCYTE [DISTWIDTH] IN BLOOD BY AUTOMATED COUNT: 12.4 % (ref 12.3–15.4)
GLOBULIN SER CALC-MCNC: 2.3 GM/DL
GLUCOSE SERPL-MCNC: 107 MG/DL (ref 65–99)
HCT VFR BLD AUTO: 44 % (ref 37.5–51)
HDLC SERPL-MCNC: 76 MG/DL (ref 40–60)
HGB BLD-MCNC: 14.9 G/DL (ref 13–17.7)
IMM GRANULOCYTES # BLD AUTO: 0.02 10*3/MM3 (ref 0–0.05)
IMM GRANULOCYTES NFR BLD AUTO: 0.4 % (ref 0–0.5)
LDLC SERPL CALC-MCNC: 100 MG/DL (ref 0–100)
LYMPHOCYTES # BLD AUTO: 1.58 10*3/MM3 (ref 0.7–3.1)
LYMPHOCYTES NFR BLD AUTO: 28.7 % (ref 19.6–45.3)
MCH RBC QN AUTO: 30.8 PG (ref 26.6–33)
MCHC RBC AUTO-ENTMCNC: 33.9 G/DL (ref 31.5–35.7)
MCV RBC AUTO: 91.1 FL (ref 79–97)
MONOCYTES # BLD AUTO: 0.48 10*3/MM3 (ref 0.1–0.9)
MONOCYTES NFR BLD AUTO: 8.7 % (ref 5–12)
NEUTROPHILS # BLD AUTO: 3.16 10*3/MM3 (ref 1.7–7)
NEUTROPHILS NFR BLD AUTO: 57.5 % (ref 42.7–76)
NRBC BLD AUTO-RTO: 0 /100 WBC (ref 0–0.2)
PLATELET # BLD AUTO: 207 10*3/MM3 (ref 140–450)
POTASSIUM SERPL-SCNC: 3.9 MMOL/L (ref 3.5–5.2)
PROT SERPL-MCNC: 6.6 G/DL (ref 6–8.5)
PSA SERPL-MCNC: 3.81 NG/ML (ref 0–4)
RBC # BLD AUTO: 4.83 10*6/MM3 (ref 4.14–5.8)
SODIUM SERPL-SCNC: 137 MMOL/L (ref 136–145)
TRIGL SERPL-MCNC: 95 MG/DL (ref 0–150)
VLDLC SERPL CALC-MCNC: 17 MG/DL (ref 5–40)
WBC # BLD AUTO: 5.5 10*3/MM3 (ref 3.4–10.8)

## 2020-12-15 LAB
HBA1C MFR BLD: 5.42 % (ref 4.8–5.6)
Lab: NORMAL
WRITTEN AUTHORIZATION: NORMAL

## 2020-12-17 ENCOUNTER — OFFICE VISIT (OUTPATIENT)
Dept: INTERNAL MEDICINE | Facility: CLINIC | Age: 75
End: 2020-12-17

## 2020-12-17 VITALS
BODY MASS INDEX: 22.29 KG/M2 | HEART RATE: 105 BPM | SYSTOLIC BLOOD PRESSURE: 168 MMHG | DIASTOLIC BLOOD PRESSURE: 62 MMHG | OXYGEN SATURATION: 99 % | HEIGHT: 67 IN | WEIGHT: 142 LBS

## 2020-12-17 DIAGNOSIS — I10 ESSENTIAL HYPERTENSION: ICD-10-CM

## 2020-12-17 DIAGNOSIS — Z00.00 HEALTHCARE MAINTENANCE: Primary | ICD-10-CM

## 2020-12-17 PROCEDURE — 99397 PER PM REEVAL EST PAT 65+ YR: CPT | Performed by: NURSE PRACTITIONER

## 2020-12-17 RX ORDER — AMLODIPINE AND VALSARTAN 10; 320 MG/1; MG/1
1 TABLET ORAL DAILY
Qty: 90 TABLET | Refills: 2 | Status: SHIPPED | OUTPATIENT
Start: 2020-12-17 | End: 2021-07-12 | Stop reason: SDUPTHER

## 2020-12-17 NOTE — PROGRESS NOTES
"Subjective   Santy Burns is a 75 y.o. male and is here for a comprehensive physical exam. The patient reports no problems.    Patient is here to follow up on hypertension which is controlled with current medications. Denies chest pain, headaches.  He checks his blood pressure at home and gets readings anywhere from 110//60.  He has brought his blood pressure cuff into the office in the past and it was accurate with readings that we are getting here in the office.  States that he gets very nervous when he comes to the doctor.      Do you take any herbs or supplements that were not prescribed by a doctor? calcium, vit D, vit B12     History:  Patient receives prostate care outside our clinic  Date last PSA: 12/10/2020    The following portions of the patient's history were reviewed and updated as appropriate: allergies, current medications, past family history, past medical history, past social history, past surgical history and problem list.    Review of Systems  Do you have pain that bothers you in your daily life? no  Pertinent items are noted in HPI.    Objective   /62 (BP Location: Left arm, Patient Position: Sitting, Cuff Size: Adult)   Pulse 105   Ht 170.2 cm (67\")   Wt 64.4 kg (142 lb)   SpO2 99%   BMI 22.24 kg/m²     General Appearance:    Alert, cooperative, no distress, appears stated age   Head:    Normocephalic, without obvious abnormality, atraumatic   Eyes:    PERRL, conjunctiva/corneas clear, EOM's intact, both eyes   Ears:    Normal TM's and external ear canals, both ears   Nose:   Deferred due to mask   Throat:   Deferred due to mask   Neck:   Supple, symmetrical, trachea midline, no adenopathy;     thyroid:  no enlargement/tenderness/nodules; no carotid    bruit   Back:     Symmetric, no curvature, ROM normal, no CVA tenderness   Lungs:     Clear to auscultation bilaterally, respirations unlabored   Chest Wall:    No tenderness or deformity    Heart:    Regular rate and " rhythm, S1 and S2 normal, no murmur       Abdomen:     Soft, non-tender, bowel sounds active all four quadrants,     no masses, no organomegaly           Extremities:   Extremities normal, atraumatic, no cyanosis or edema   Pulses:   2+ and symmetric all extremities   Skin:   Skin color, texture, turgor normal, no rashes or lesions   Lymph nodes:   Cervical, supraclavicular, and axillary nodes normal   Neurologic:   Grossly intact, normal strength, sensation and reflexes     throughout        No visits with results within 2 Week(s) from this visit.   Latest known visit with results is:   Orders Only on 03/17/2020   Component Date Value Ref Range Status   • WBC 12/10/2020 5.50  3.40 - 10.80 10*3/mm3 Final   • RBC 12/10/2020 4.83  4.14 - 5.80 10*6/mm3 Final   • Hemoglobin 12/10/2020 14.9  13.0 - 17.7 g/dL Final   • Hematocrit 12/10/2020 44.0  37.5 - 51.0 % Final   • MCV 12/10/2020 91.1  79.0 - 97.0 fL Final   • MCH 12/10/2020 30.8  26.6 - 33.0 pg Final   • MCHC 12/10/2020 33.9  31.5 - 35.7 g/dL Final   • RDW 12/10/2020 12.4  12.3 - 15.4 % Final   • Platelets 12/10/2020 207  140 - 450 10*3/mm3 Final   • Neutrophil Rel % 12/10/2020 57.5  42.7 - 76.0 % Final   • Lymphocyte Rel % 12/10/2020 28.7  19.6 - 45.3 % Final   • Monocyte Rel % 12/10/2020 8.7  5.0 - 12.0 % Final   • Eosinophil Rel % 12/10/2020 4.2  0.3 - 6.2 % Final   • Basophil Rel % 12/10/2020 0.5  0.0 - 1.5 % Final   • Neutrophils Absolute 12/10/2020 3.16  1.70 - 7.00 10*3/mm3 Final   • Lymphocytes Absolute 12/10/2020 1.58  0.70 - 3.10 10*3/mm3 Final   • Monocytes Absolute 12/10/2020 0.48  0.10 - 0.90 10*3/mm3 Final   • Eosinophils Absolute 12/10/2020 0.23  0.00 - 0.40 10*3/mm3 Final   • Basophils Absolute 12/10/2020 0.03  0.00 - 0.20 10*3/mm3 Final   • Immature Granulocyte Rel % 12/10/2020 0.4  0.0 - 0.5 % Final   • Immature Grans Absolute 12/10/2020 0.02  0.00 - 0.05 10*3/mm3 Final   • nRBC 12/10/2020 0.0  0.0 - 0.2 /100 WBC Final   • Glucose 12/10/2020 107*  65 - 99 mg/dL Final   • BUN 12/10/2020 20  8 - 23 mg/dL Final   • Creatinine 12/10/2020 0.95  0.76 - 1.27 mg/dL Final   • eGFR Non African Am 12/10/2020 77  >60 mL/min/1.73 Final    Comment: The MDRD GFR formula is only valid for adults with stable  renal function between ages 18 and 70.     • eGFR  Am 12/10/2020 94  >60 mL/min/1.73 Final   • BUN/Creatinine Ratio 12/10/2020 21.1  7.0 - 25.0 Final   • Sodium 12/10/2020 137  136 - 145 mmol/L Final   • Potassium 12/10/2020 3.9  3.5 - 5.2 mmol/L Final   • Chloride 12/10/2020 100  98 - 107 mmol/L Final   • Total CO2 12/10/2020 28.9  22.0 - 29.0 mmol/L Final   • Calcium 12/10/2020 9.3  8.6 - 10.5 mg/dL Final   • Total Protein 12/10/2020 6.6  6.0 - 8.5 g/dL Final   • Albumin 12/10/2020 4.30  3.50 - 5.20 g/dL Final   • Globulin 12/10/2020 2.3  gm/dL Final   • A/G Ratio 12/10/2020 1.9  g/dL Final   • Total Bilirubin 12/10/2020 0.6  0.0 - 1.2 mg/dL Final   • Alkaline Phosphatase 12/10/2020 84  39 - 117 U/L Final   • AST (SGOT) 12/10/2020 24  1 - 40 U/L Final   • ALT (SGPT) 12/10/2020 25  1 - 41 U/L Final   • Total Cholesterol 12/10/2020 193  0 - 200 mg/dL Final   • Triglycerides 12/10/2020 95  0 - 150 mg/dL Final   • HDL Cholesterol 12/10/2020 76* 40 - 60 mg/dL Final   • VLDL Cholesterol Andrzej 12/10/2020 17  5 - 40 mg/dL Final   • LDL Chol Calc (NIH) 12/10/2020 100  0 - 100 mg/dL Final   • Chol/HDL Ratio 12/10/2020 2.54   Final   • PSA 12/10/2020 3.810  0.000 - 4.000 ng/mL Final    Results may be falsely decreased if patient taking Biotin.   • Hemoglobin A1C 12/10/2020 5.42  4.80 - 5.60 % Final    Comment: Hemoglobin A1C Ranges:  Increased Risk for Diabetes  5.7% to 6.4%  Diabetes                     >= 6.5%  Diabetic Goal                < 7.0%     • Please note 12/10/2020 Comment   Final    Comment: We have received your request for additional testing or test  verification.  You will be notified if we are unable to process  your request.     • Written Authorization  12/10/2020 Comment   Final    Comment: Written Authorization Received.  Authorization received from WRITTEN REQUEST 12-  Logged by Jocelyn Barrios       Reviewed labs with patient.     Assessment/Plan   Healthy male exam.      1. Diagnoses and all orders for this visit:    1. Healthcare maintenance (Primary)    2. Essential hypertension  -     amLODIPine-valsartan (EXFORGE)  MG per tablet; Take 1 tablet by mouth Daily.  Dispense: 90 tablet; Refill: 2    HTN - no med changes at this time.     2. Patient Counseling:  --Nutrition: Stressed importance of moderation in sodium/caffeine intake, saturated fat and cholesterol, caloric balance, sufficient intake of fresh fruits, vegetables, fiber, calcium, iron.  --Exercise: Stressed the importance of regular exercise.   --Injury prevention: Discussed safety belts, safety helmets, smoke detector.   --Dental health: Discussed importance of regular tooth brushing, flossing, and dental visits.  --Immunizations reviewed.    3. Discussed the patient's BMI with him.  The BMI is in the acceptable range  4. Follow up in 6 months

## 2021-07-06 DIAGNOSIS — R73.01 ELEVATED FASTING GLUCOSE: ICD-10-CM

## 2021-07-06 DIAGNOSIS — N20.0 KIDNEY STONES: ICD-10-CM

## 2021-07-06 DIAGNOSIS — I10 ESSENTIAL HYPERTENSION: Primary | ICD-10-CM

## 2021-07-07 LAB
ALBUMIN SERPL-MCNC: 4.6 G/DL (ref 3.5–5.2)
ALBUMIN/GLOB SERPL: 2.3 G/DL
ALP SERPL-CCNC: 86 U/L (ref 39–117)
ALT SERPL-CCNC: 16 U/L (ref 1–41)
AST SERPL-CCNC: 20 U/L (ref 1–40)
BASOPHILS # BLD AUTO: 0.03 10*3/MM3 (ref 0–0.2)
BASOPHILS NFR BLD AUTO: 0.7 % (ref 0–1.5)
BILIRUB SERPL-MCNC: 0.6 MG/DL (ref 0–1.2)
BUN SERPL-MCNC: 13 MG/DL (ref 8–23)
BUN/CREAT SERPL: 12.7 (ref 7–25)
CALCIUM SERPL-MCNC: 9.4 MG/DL (ref 8.6–10.5)
CHLORIDE SERPL-SCNC: 106 MMOL/L (ref 98–107)
CHOLEST SERPL-MCNC: 181 MG/DL (ref 0–200)
CHOLEST/HDLC SERPL: 2.62 {RATIO}
CO2 SERPL-SCNC: 25.7 MMOL/L (ref 22–29)
CREAT SERPL-MCNC: 1.02 MG/DL (ref 0.76–1.27)
EOSINOPHIL # BLD AUTO: 0.2 10*3/MM3 (ref 0–0.4)
EOSINOPHIL NFR BLD AUTO: 4.4 % (ref 0.3–6.2)
ERYTHROCYTE [DISTWIDTH] IN BLOOD BY AUTOMATED COUNT: 12.1 % (ref 12.3–15.4)
GLOBULIN SER CALC-MCNC: 2 GM/DL
GLUCOSE SERPL-MCNC: 101 MG/DL (ref 65–99)
HBA1C MFR BLD: 5.2 % (ref 4.8–5.6)
HCT VFR BLD AUTO: 43 % (ref 37.5–51)
HDLC SERPL-MCNC: 69 MG/DL (ref 40–60)
HGB BLD-MCNC: 14.4 G/DL (ref 13–17.7)
IMM GRANULOCYTES # BLD AUTO: 0.01 10*3/MM3 (ref 0–0.05)
IMM GRANULOCYTES NFR BLD AUTO: 0.2 % (ref 0–0.5)
LDLC SERPL CALC-MCNC: 98 MG/DL (ref 0–100)
LYMPHOCYTES # BLD AUTO: 1.14 10*3/MM3 (ref 0.7–3.1)
LYMPHOCYTES NFR BLD AUTO: 24.8 % (ref 19.6–45.3)
MCH RBC QN AUTO: 30.6 PG (ref 26.6–33)
MCHC RBC AUTO-ENTMCNC: 33.5 G/DL (ref 31.5–35.7)
MCV RBC AUTO: 91.5 FL (ref 79–97)
MONOCYTES # BLD AUTO: 0.46 10*3/MM3 (ref 0.1–0.9)
MONOCYTES NFR BLD AUTO: 10 % (ref 5–12)
NEUTROPHILS # BLD AUTO: 2.75 10*3/MM3 (ref 1.7–7)
NEUTROPHILS NFR BLD AUTO: 59.9 % (ref 42.7–76)
NRBC BLD AUTO-RTO: 0 /100 WBC (ref 0–0.2)
PLATELET # BLD AUTO: 187 10*3/MM3 (ref 140–450)
POTASSIUM SERPL-SCNC: 4.2 MMOL/L (ref 3.5–5.2)
PROT SERPL-MCNC: 6.6 G/DL (ref 6–8.5)
RBC # BLD AUTO: 4.7 10*6/MM3 (ref 4.14–5.8)
SODIUM SERPL-SCNC: 141 MMOL/L (ref 136–145)
TRIGL SERPL-MCNC: 76 MG/DL (ref 0–150)
VLDLC SERPL CALC-MCNC: 14 MG/DL (ref 5–40)
WBC # BLD AUTO: 4.59 10*3/MM3 (ref 3.4–10.8)

## 2021-07-12 ENCOUNTER — OFFICE VISIT (OUTPATIENT)
Dept: INTERNAL MEDICINE | Facility: CLINIC | Age: 76
End: 2021-07-12

## 2021-07-12 VITALS
WEIGHT: 147.7 LBS | DIASTOLIC BLOOD PRESSURE: 74 MMHG | BODY MASS INDEX: 23.18 KG/M2 | TEMPERATURE: 97.3 F | SYSTOLIC BLOOD PRESSURE: 158 MMHG | OXYGEN SATURATION: 99 % | HEART RATE: 83 BPM | HEIGHT: 67 IN

## 2021-07-12 DIAGNOSIS — I10 ESSENTIAL HYPERTENSION: ICD-10-CM

## 2021-07-12 PROCEDURE — 99213 OFFICE O/P EST LOW 20 MIN: CPT | Performed by: NURSE PRACTITIONER

## 2021-07-12 RX ORDER — AMLODIPINE AND VALSARTAN 10; 320 MG/1; MG/1
1 TABLET ORAL DAILY
Qty: 90 TABLET | Refills: 3 | Status: SHIPPED | OUTPATIENT
Start: 2021-07-12 | End: 2022-09-07

## 2021-07-12 NOTE — PROGRESS NOTES
Subjective   Santy Burns is a 76 y.o. male. Patient is here today for   Chief Complaint   Patient presents with   • Hypertension     Pt is here to f/u on labs.   .    History of Present Illness   Patient is here to follow up on hypertension which is controlled with current medications. Denies chest pain, headaches. He brought in readings from home that range from 115/60 to 136/64 He has recently started going back to the gym. Stretches regularly for his back to prevent pain, especially when exercising    Patient is here to follow up on elevated fasting glucose.  Denies any concerns    The following portions of the patient's history were reviewed and updated as appropriate: allergies, current medications, past family history, past medical history, past social history, past surgical history and problem list.    Review of Systems    Objective     Vitals:    07/12/21 0928   BP: 158/74   Pulse: 83   Temp: 97.3 °F (36.3 °C)   SpO2: 99%     Body mass index is 23.13 kg/m².    Orders Only on 07/06/2021   Component Date Value Ref Range Status   • Hemoglobin A1C 07/07/2021 5.20  4.80 - 5.60 % Final    Comment: Hemoglobin A1C Ranges:  Increased Risk for Diabetes  5.7% to 6.4%  Diabetes                     >= 6.5%  Diabetic Goal                < 7.0%     • WBC 07/07/2021 4.59  3.40 - 10.80 10*3/mm3 Final   • RBC 07/07/2021 4.70  4.14 - 5.80 10*6/mm3 Final   • Hemoglobin 07/07/2021 14.4  13.0 - 17.7 g/dL Final   • Hematocrit 07/07/2021 43.0  37.5 - 51.0 % Final   • MCV 07/07/2021 91.5  79.0 - 97.0 fL Final   • MCH 07/07/2021 30.6  26.6 - 33.0 pg Final   • MCHC 07/07/2021 33.5  31.5 - 35.7 g/dL Final   • RDW 07/07/2021 12.1* 12.3 - 15.4 % Final   • Platelets 07/07/2021 187  140 - 450 10*3/mm3 Final   • Neutrophil Rel % 07/07/2021 59.9  42.7 - 76.0 % Final   • Lymphocyte Rel % 07/07/2021 24.8  19.6 - 45.3 % Final   • Monocyte Rel % 07/07/2021 10.0  5.0 - 12.0 % Final   • Eosinophil Rel % 07/07/2021 4.4  0.3 - 6.2 % Final   •  Basophil Rel % 07/07/2021 0.7  0.0 - 1.5 % Final   • Neutrophils Absolute 07/07/2021 2.75  1.70 - 7.00 10*3/mm3 Final   • Lymphocytes Absolute 07/07/2021 1.14  0.70 - 3.10 10*3/mm3 Final   • Monocytes Absolute 07/07/2021 0.46  0.10 - 0.90 10*3/mm3 Final   • Eosinophils Absolute 07/07/2021 0.20  0.00 - 0.40 10*3/mm3 Final   • Basophils Absolute 07/07/2021 0.03  0.00 - 0.20 10*3/mm3 Final   • Immature Granulocyte Rel % 07/07/2021 0.2  0.0 - 0.5 % Final   • Immature Grans Absolute 07/07/2021 0.01  0.00 - 0.05 10*3/mm3 Final   • nRBC 07/07/2021 0.0  0.0 - 0.2 /100 WBC Final   • Glucose 07/07/2021 101* 65 - 99 mg/dL Final   • BUN 07/07/2021 13  8 - 23 mg/dL Final   • Creatinine 07/07/2021 1.02  0.76 - 1.27 mg/dL Final   • eGFR Non  Am 07/07/2021 71  >60 mL/min/1.73 Final    Comment: The MDRD GFR formula is only valid for adults with stable  renal function between ages 18 and 70.     • eGFR  Am 07/07/2021 86  >60 mL/min/1.73 Final   • BUN/Creatinine Ratio 07/07/2021 12.7  7.0 - 25.0 Final   • Sodium 07/07/2021 141  136 - 145 mmol/L Final   • Potassium 07/07/2021 4.2  3.5 - 5.2 mmol/L Final   • Chloride 07/07/2021 106  98 - 107 mmol/L Final   • Total CO2 07/07/2021 25.7  22.0 - 29.0 mmol/L Final   • Calcium 07/07/2021 9.4  8.6 - 10.5 mg/dL Final   • Total Protein 07/07/2021 6.6  6.0 - 8.5 g/dL Final   • Albumin 07/07/2021 4.60  3.50 - 5.20 g/dL Final   • Globulin 07/07/2021 2.0  gm/dL Final   • A/G Ratio 07/07/2021 2.3  g/dL Final   • Total Bilirubin 07/07/2021 0.6  0.0 - 1.2 mg/dL Final   • Alkaline Phosphatase 07/07/2021 86  39 - 117 U/L Final   • AST (SGOT) 07/07/2021 20  1 - 40 U/L Final   • ALT (SGPT) 07/07/2021 16  1 - 41 U/L Final   • Total Cholesterol 07/07/2021 181  0 - 200 mg/dL Final    Comment: Cholesterol Reference Ranges  (U.S. Department of Health and Human Services ATP III  Classifications)  Desirable          <200 mg/dL  Borderline High    200-239 mg/dL  High Risk          >240  mg/dL  Triglyceride Reference Ranges  (U.S. Department of Health and Human Services ATP III  Classifications)  Normal           <150 mg/dL  Borderline High  150-199 mg/dL  High             200-499 mg/dL  Very High        >500 mg/dL  HDL Reference Ranges  (U.S. Department of Health and Human Services ATP III  Classifcations)  Low     <40 mg/dl (major risk factor for CHD)  High    >60 mg/dl ('negative' risk factor for CHD)  LDL Reference Ranges  (U.S. Department of Health and Human Services ATP III  Classifcations)  Optimal          <100 mg/dL  Near Optimal     100-129 mg/dL  Borderline High  130-159 mg/dL  High             160-189 mg/dL  Very High        >189 mg/dL     • Triglycerides 07/07/2021 76  0 - 150 mg/dL Final   • HDL Cholesterol 07/07/2021 69* 40 - 60 mg/dL Final   • VLDL Cholesterol Andrzej 07/07/2021 14  5 - 40 mg/dL Final   • LDL Chol Calc ([facility]) 07/07/2021 98  0 - 100 mg/dL Final   • Chol/HDL Ratio 07/07/2021 2.62   Final     Reviewed labs with patient.     Physical Exam  Vitals and nursing note reviewed.   Constitutional:       Appearance: Normal appearance. He is well-developed.   Cardiovascular:      Rate and Rhythm: Normal rate and regular rhythm.      Heart sounds: Normal heart sounds.   Pulmonary:      Effort: Pulmonary effort is normal.      Breath sounds: Normal breath sounds.   Skin:     General: Skin is warm and dry.   Neurological:      Mental Status: He is alert and oriented to person, place, and time.   Psychiatric:         Speech: Speech normal.         Behavior: Behavior normal.         Thought Content: Thought content normal.         Assessment/Plan   Diagnoses and all orders for this visit:    1. Essential hypertension  -     amLODIPine-valsartan (EXFORGE)  MG per tablet; Take 1 tablet by mouth Daily.  Dispense: 90 tablet; Refill: 3    Continue with current medication for hypertension.  Follow-up in 9 months for wellness exam with fasting labs prior (patient sees urology for his PSA  check)             Current Outpatient Medications:   •  amLODIPine-valsartan (EXFORGE)  MG per tablet, Take 1 tablet by mouth Daily., Disp: 90 tablet, Rfl: 2  •  Calcium Carb-Cholecalciferol (CALCIUM 1000 + D PO), Take 1 tablet by mouth Daily., Disp: , Rfl:   •  cholecalciferol (VITAMIN D3) 1000 units tablet, Take 1,000 Units by mouth Daily., Disp: , Rfl:   •  desonide (DESOWEN) 0.05 % ointment, Apply 1 application topically to the appropriate area as directed As Needed., Disp: , Rfl:   •  fluticasone (FLONASE) 50 MCG/ACT nasal spray, 1 spray into the nostril(s) as directed by provider As Needed for Rhinitis., Disp: , Rfl:   •  Triamcinolone Acetonide 0.05 % ointment, Apply 1 application topically As Needed., Disp: , Rfl:   •  vitamin B-12 (CYANOCOBALAMIN) 1000 MCG tablet, Take 1,000 mcg by mouth Daily., Disp: , Rfl:

## 2022-03-28 DIAGNOSIS — I10 ESSENTIAL HYPERTENSION: ICD-10-CM

## 2022-03-28 DIAGNOSIS — R35.1 NOCTURIA: ICD-10-CM

## 2022-03-28 DIAGNOSIS — Z00.00 HEALTHCARE MAINTENANCE: Primary | ICD-10-CM

## 2022-04-02 LAB
ALBUMIN SERPL-MCNC: 4.5 G/DL (ref 3.7–4.7)
ALBUMIN/GLOB SERPL: 2 {RATIO} (ref 1.2–2.2)
ALP SERPL-CCNC: 107 IU/L (ref 44–121)
ALT SERPL-CCNC: 29 IU/L (ref 0–44)
AST SERPL-CCNC: 27 IU/L (ref 0–40)
BASOPHILS # BLD AUTO: 0 X10E3/UL (ref 0–0.2)
BASOPHILS NFR BLD AUTO: 1 %
BILIRUB SERPL-MCNC: 0.6 MG/DL (ref 0–1.2)
BUN SERPL-MCNC: 18 MG/DL (ref 8–27)
BUN/CREAT SERPL: 18 (ref 10–24)
CALCIUM SERPL-MCNC: 9.5 MG/DL (ref 8.6–10.2)
CHLORIDE SERPL-SCNC: 101 MMOL/L (ref 96–106)
CHOLEST SERPL-MCNC: 185 MG/DL (ref 100–199)
CHOLEST/HDLC SERPL: 3 RATIO (ref 0–5)
CO2 SERPL-SCNC: 24 MMOL/L (ref 20–29)
CREAT SERPL-MCNC: 1.02 MG/DL (ref 0.76–1.27)
EGFRCR SERPLBLD CKD-EPI 2021: 76 ML/MIN/1.73
EOSINOPHIL # BLD AUTO: 0.2 X10E3/UL (ref 0–0.4)
EOSINOPHIL NFR BLD AUTO: 3 %
ERYTHROCYTE [DISTWIDTH] IN BLOOD BY AUTOMATED COUNT: 12.4 % (ref 11.6–15.4)
GLOBULIN SER CALC-MCNC: 2.3 G/DL (ref 1.5–4.5)
GLUCOSE SERPL-MCNC: 98 MG/DL (ref 65–99)
HCT VFR BLD AUTO: 45.5 % (ref 37.5–51)
HDLC SERPL-MCNC: 61 MG/DL
HGB BLD-MCNC: 15.4 G/DL (ref 13–17.7)
IMM GRANULOCYTES # BLD AUTO: 0 X10E3/UL (ref 0–0.1)
IMM GRANULOCYTES NFR BLD AUTO: 0 %
LDLC SERPL CALC-MCNC: 106 MG/DL (ref 0–99)
LYMPHOCYTES # BLD AUTO: 1.3 X10E3/UL (ref 0.7–3.1)
LYMPHOCYTES NFR BLD AUTO: 27 %
MCH RBC QN AUTO: 30.8 PG (ref 26.6–33)
MCHC RBC AUTO-ENTMCNC: 33.8 G/DL (ref 31.5–35.7)
MCV RBC AUTO: 91 FL (ref 79–97)
MONOCYTES # BLD AUTO: 0.6 X10E3/UL (ref 0.1–0.9)
MONOCYTES NFR BLD AUTO: 12 %
NEUTROPHILS # BLD AUTO: 2.8 X10E3/UL (ref 1.4–7)
NEUTROPHILS NFR BLD AUTO: 57 %
PLATELET # BLD AUTO: 211 X10E3/UL (ref 150–450)
POTASSIUM SERPL-SCNC: 4.3 MMOL/L (ref 3.5–5.2)
PROT SERPL-MCNC: 6.8 G/DL (ref 6–8.5)
PSA SERPL-MCNC: 5.5 NG/ML (ref 0–4)
RBC # BLD AUTO: 5 X10E6/UL (ref 4.14–5.8)
SODIUM SERPL-SCNC: 140 MMOL/L (ref 134–144)
TRIGL SERPL-MCNC: 100 MG/DL (ref 0–149)
TSH SERPL DL<=0.005 MIU/L-ACNC: 4.04 UIU/ML (ref 0.45–4.5)
VLDLC SERPL CALC-MCNC: 18 MG/DL (ref 5–40)
WBC # BLD AUTO: 4.9 X10E3/UL (ref 3.4–10.8)

## 2022-04-11 ENCOUNTER — OFFICE VISIT (OUTPATIENT)
Dept: INTERNAL MEDICINE | Facility: CLINIC | Age: 77
End: 2022-04-11

## 2022-04-11 VITALS
HEART RATE: 88 BPM | BODY MASS INDEX: 23.7 KG/M2 | WEIGHT: 151 LBS | SYSTOLIC BLOOD PRESSURE: 148 MMHG | DIASTOLIC BLOOD PRESSURE: 62 MMHG | OXYGEN SATURATION: 97 % | HEIGHT: 67 IN

## 2022-04-11 DIAGNOSIS — R97.20 ELEVATED PSA, LESS THAN 10 NG/ML: ICD-10-CM

## 2022-04-11 DIAGNOSIS — Z00.00 HEALTHCARE MAINTENANCE: Primary | ICD-10-CM

## 2022-04-11 DIAGNOSIS — Z11.59 ENCOUNTER FOR HEPATITIS C SCREENING TEST FOR LOW RISK PATIENT: ICD-10-CM

## 2022-04-11 DIAGNOSIS — I10 ESSENTIAL HYPERTENSION: ICD-10-CM

## 2022-04-11 DIAGNOSIS — I10 ESSENTIAL HYPERTENSION: Primary | ICD-10-CM

## 2022-04-11 DIAGNOSIS — R73.01 ELEVATED FASTING GLUCOSE: ICD-10-CM

## 2022-04-11 PROCEDURE — 99213 OFFICE O/P EST LOW 20 MIN: CPT | Performed by: NURSE PRACTITIONER

## 2022-04-11 PROCEDURE — 99397 PER PM REEVAL EST PAT 65+ YR: CPT | Performed by: NURSE PRACTITIONER

## 2022-04-11 NOTE — PROGRESS NOTES
"Subjective   Santy Burns is a 77 y.o. male and is here for a comprehensive physical exam. The patient reports no problems.    Do you take any herbs or supplements that were not prescribed by a doctor? calcium, vit D, vit B12    Patient is here to follow up on hypertension which is controlled with current medications. Denies chest pain, headaches.   He checks his BP occasionally - 115/64, 106/61, 100/55, 126/65      History:  Patient receives prostate care outside our clinic  Date last PSA: 4/2022  Sees urology on Thursday, April 14. This is the first elevated PSA. He has seen urology in the past for kidney stones    The following portions of the patient's history were reviewed and updated as appropriate: allergies, current medications, past family history, past medical history, past social history, past surgical history and problem list.    Review of Systems  Do you have pain that bothers you in your daily life? occasional back pain  A comprehensive review of systems was negative.    Objective   /62 (BP Location: Left arm, Patient Position: Sitting, Cuff Size: Adult)   Pulse 88   Ht 170.2 cm (67\")   Wt 68.5 kg (151 lb)   SpO2 97%   BMI 23.65 kg/m²     General Appearance:    Alert, cooperative, no distress, appears stated age   Head:    Normocephalic, without obvious abnormality, atraumatic   Eyes:    PERRL, conjunctiva/corneas clear, EOM's intact, both eyes   Ears:    Normal TM's and external ear canals, both ears   Nose:   Nares normal, septum midline, mucosa normal, no drainage    or sinus tenderness   Throat:   Lips, mucosa, and tongue normal; teeth and gums normal   Neck:   Supple, symmetrical, trachea midline, no adenopathy;     thyroid:  no enlargement/tenderness/nodules; no carotid    Bruit; lipoma left side of neck (sees derm)   Back:     Symmetric, no curvature, ROM normal, no CVA tenderness   Lungs:     Clear to auscultation bilaterally, respirations unlabored   Chest Wall:    No " tenderness or deformity    Heart:    Regular rate and rhythm, S1 and S2 normal, no murmur       Abdomen:     Soft, non-tender, bowel sounds active all four quadrants,     no masses, no organomegaly           Extremities:   Extremities normal, atraumatic, no cyanosis or edema   Pulses:   2+ and symmetric all extremities   Skin:   Skin color, texture, turgor normal, no rashes or lesions   Lymph nodes:   Cervical, supraclavicular, and axillary nodes normal   Neurologic:   Grossly intact, normal strength, sensation and reflexes     Throughout; slight tremor     No visits with results within 2 Week(s) from this visit.   Latest known visit with results is:   Orders Only on 03/28/2022   Component Date Value Ref Range Status   • WBC 04/01/2022 4.9  3.4 - 10.8 x10E3/uL Final   • RBC 04/01/2022 5.00  4.14 - 5.80 x10E6/uL Final   • Hemoglobin 04/01/2022 15.4  13.0 - 17.7 g/dL Final   • Hematocrit 04/01/2022 45.5  37.5 - 51.0 % Final   • MCV 04/01/2022 91  79 - 97 fL Final   • MCH 04/01/2022 30.8  26.6 - 33.0 pg Final   • MCHC 04/01/2022 33.8  31.5 - 35.7 g/dL Final   • RDW 04/01/2022 12.4  11.6 - 15.4 % Final   • Platelets 04/01/2022 211  150 - 450 x10E3/uL Final   • Neutrophil Rel % 04/01/2022 57  Not Estab. % Final   • Lymphocyte Rel % 04/01/2022 27  Not Estab. % Final   • Monocyte Rel % 04/01/2022 12  Not Estab. % Final   • Eosinophil Rel % 04/01/2022 3  Not Estab. % Final   • Basophil Rel % 04/01/2022 1  Not Estab. % Final   • Neutrophils Absolute 04/01/2022 2.8  1.4 - 7.0 x10E3/uL Final   • Lymphocytes Absolute 04/01/2022 1.3  0.7 - 3.1 x10E3/uL Final   • Monocytes Absolute 04/01/2022 0.6  0.1 - 0.9 x10E3/uL Final   • Eosinophils Absolute 04/01/2022 0.2  0.0 - 0.4 x10E3/uL Final   • Basophils Absolute 04/01/2022 0.0  0.0 - 0.2 x10E3/uL Final   • Immature Granulocyte Rel % 04/01/2022 0  Not Estab. % Final   • Immature Grans Absolute 04/01/2022 0.0  0.0 - 0.1 x10E3/uL Final   • Glucose 04/01/2022 98  65 - 99 mg/dL Final   •  BUN 04/01/2022 18  8 - 27 mg/dL Final   • Creatinine 04/01/2022 1.02  0.76 - 1.27 mg/dL Final   • EGFR Result 04/01/2022 76  >59 mL/min/1.73 Final   • BUN/Creatinine Ratio 04/01/2022 18  10 - 24 Final   • Sodium 04/01/2022 140  134 - 144 mmol/L Final   • Potassium 04/01/2022 4.3  3.5 - 5.2 mmol/L Final   • Chloride 04/01/2022 101  96 - 106 mmol/L Final   • Total CO2 04/01/2022 24  20 - 29 mmol/L Final   • Calcium 04/01/2022 9.5  8.6 - 10.2 mg/dL Final   • Total Protein 04/01/2022 6.8  6.0 - 8.5 g/dL Final   • Albumin 04/01/2022 4.5  3.7 - 4.7 g/dL Final   • Globulin 04/01/2022 2.3  1.5 - 4.5 g/dL Final   • A/G Ratio 04/01/2022 2.0  1.2 - 2.2 Final   • Total Bilirubin 04/01/2022 0.6  0.0 - 1.2 mg/dL Final   • Alkaline Phosphatase 04/01/2022 107  44 - 121 IU/L Final   • AST (SGOT) 04/01/2022 27  0 - 40 IU/L Final   • ALT (SGPT) 04/01/2022 29  0 - 44 IU/L Final   • Total Cholesterol 04/01/2022 185  100 - 199 mg/dL Final   • Triglycerides 04/01/2022 100  0 - 149 mg/dL Final   • HDL Cholesterol 04/01/2022 61  >39 mg/dL Final   • VLDL Cholesterol Andrzej 04/01/2022 18  5 - 40 mg/dL Final   • LDL Chol Calc ([facility]) 04/01/2022 106 (A) 0 - 99 mg/dL Final   • Chol/HDL Ratio 04/01/2022 3.0  0.0 - 5.0 ratio Final    Comment:                                   T. Chol/HDL Ratio                                              Men  Women                                1/2 Avg.Risk  3.4    3.3                                    Avg.Risk  5.0    4.4                                 2X Avg.Risk  9.6    7.1                                 3X Avg.Risk 23.4   11.0     • TSH 04/01/2022 4.040  0.450 - 4.500 uIU/mL Final   • PSA 04/01/2022 5.5 (A) 0.0 - 4.0 ng/mL Final    Comment: Roche ECLIA methodology.  According to the American Urological Association, Serum PSA should  decrease and remain at undetectable levels after radical  prostatectomy. The AUA defines biochemical recurrence as an initial  PSA value 0.2 ng/mL or greater followed by a  subsequent confirmatory  PSA value 0.2 ng/mL or greater.  Values obtained with different assay methods or kits cannot be used  interchangeably. Results cannot be interpreted as absolute evidence  of the presence or absence of malignant disease.       Reviewed labs with patient.      Assessment/Plan   Healthy male exam.      1. Diagnoses and all orders for this visit:    1. Healthcare maintenance (Primary)    2. Essential hypertension    3. Elevated PSA, less than 10 ng/ml      HTN - continue amlodipine-valsartan  mg daily. F/u in 6 months    Elevated PSA - will f/u with urology as already scheduled    2. Patient Counseling:  --Nutrition: Stressed importance of moderation in sodium/caffeine intake, saturated fat and cholesterol, caloric balance, sufficient intake of fresh fruits, vegetables, fiber, calcium, iron. Vegetarian every other day; non-dairy; gluten free  --Exercise: Stressed the importance of regular exercise.   --Injury prevention: Discussed safety belts, safety helmets, smoke detector.   --Dental health: Discussed importance of regular tooth brushing, flossing, and dental visits.  --Immunizations reviewed.    3. Discussed the patient's BMI with him.  The BMI is in the acceptable range  4. Follow up in 6 months

## 2022-09-07 DIAGNOSIS — I10 ESSENTIAL HYPERTENSION: ICD-10-CM

## 2022-09-07 RX ORDER — AMLODIPINE AND VALSARTAN 10; 320 MG/1; MG/1
TABLET ORAL
Qty: 90 TABLET | Refills: 0 | Status: SHIPPED | OUTPATIENT
Start: 2022-09-07 | End: 2022-10-10 | Stop reason: SDUPTHER

## 2022-10-10 ENCOUNTER — OFFICE VISIT (OUTPATIENT)
Dept: INTERNAL MEDICINE | Facility: CLINIC | Age: 77
End: 2022-10-10

## 2022-10-10 VITALS
OXYGEN SATURATION: 99 % | TEMPERATURE: 98 F | HEART RATE: 70 BPM | WEIGHT: 150 LBS | SYSTOLIC BLOOD PRESSURE: 146 MMHG | DIASTOLIC BLOOD PRESSURE: 52 MMHG | BODY MASS INDEX: 23.54 KG/M2 | HEIGHT: 67 IN

## 2022-10-10 DIAGNOSIS — K40.90 NON-RECURRENT UNILATERAL INGUINAL HERNIA WITHOUT OBSTRUCTION OR GANGRENE: ICD-10-CM

## 2022-10-10 DIAGNOSIS — I10 ESSENTIAL HYPERTENSION: Primary | ICD-10-CM

## 2022-10-10 PROCEDURE — 99213 OFFICE O/P EST LOW 20 MIN: CPT | Performed by: NURSE PRACTITIONER

## 2022-10-10 RX ORDER — AMLODIPINE AND VALSARTAN 10; 320 MG/1; MG/1
1 TABLET ORAL DAILY
Qty: 90 TABLET | Refills: 1 | Status: SHIPPED | OUTPATIENT
Start: 2022-10-10

## 2022-10-10 NOTE — PROGRESS NOTES
Subjective   Santy Burns is a 77 y.o. male. Patient is here today for   Chief Complaint   Patient presents with   • Hypertension   .    History of Present Illness   Patient is here to follow up on hypertension which is controlled with current medications. Denies chest pain, headaches.   A few of his BP readings at home have been 124/66, 106/59,  131/69    C/o possible hernia on right side. He noticed it recently. Denies any pain or discomfort.     The following portions of the patient's history were reviewed and updated as appropriate: allergies, current medications, past family history, past medical history, past social history, past surgical history and problem list.    Review of Systems    Objective   Vitals:    10/10/22 0944   BP:    Pulse: 70   Temp:    SpO2:      Body mass index is 23.49 kg/m².   /52, SpO2 99%     Physical Exam  Vitals and nursing note reviewed.   Constitutional:       Appearance: Normal appearance. He is well-developed.   Cardiovascular:      Rate and Rhythm: Normal rate and regular rhythm.      Heart sounds: Normal heart sounds.   Pulmonary:      Effort: Pulmonary effort is normal.      Breath sounds: Normal breath sounds.   Abdominal:      Hernia: A hernia is present. Hernia is present in the right inguinal area.   Skin:     General: Skin is warm and dry.   Neurological:      Mental Status: He is alert and oriented to person, place, and time.   Psychiatric:         Speech: Speech normal.         Behavior: Behavior normal.         Thought Content: Thought content normal.         Assessment & Plan   Diagnoses and all orders for this visit:    1. Essential hypertension (Primary)  -     amLODIPine-valsartan (EXFORGE)  MG per tablet; Take 1 tablet by mouth Daily.  Dispense: 90 tablet; Refill: 1    2. Non-recurrent unilateral inguinal hernia without obstruction or gangrene  -     Ambulatory Referral to General Surgery      HTN - continue current medications    Hernia - will refer  to general surgery

## 2022-10-19 ENCOUNTER — OFFICE VISIT (OUTPATIENT)
Dept: SURGERY | Facility: CLINIC | Age: 77
End: 2022-10-19

## 2022-10-19 VITALS — WEIGHT: 151 LBS | BODY MASS INDEX: 23.7 KG/M2 | HEIGHT: 67 IN

## 2022-10-19 DIAGNOSIS — K40.90 NON-RECURRENT UNILATERAL INGUINAL HERNIA WITHOUT OBSTRUCTION OR GANGRENE: Primary | ICD-10-CM

## 2022-10-19 PROCEDURE — 99203 OFFICE O/P NEW LOW 30 MIN: CPT | Performed by: SURGERY

## 2022-10-19 RX ORDER — CEFAZOLIN SODIUM 2 G/100ML
2 INJECTION, SOLUTION INTRAVENOUS ONCE
Status: CANCELLED | OUTPATIENT
Start: 2022-12-06 | End: 2022-10-19

## 2022-10-19 NOTE — PROGRESS NOTES
General Surgery Initial Office Visit    Referring Provider: DORCAS Love    Chief Complaint:    Right inguinal hernia    History of Present Illness:    Santy Burns is a very pleasant 77 y.o. male with a right inguinal hernia, which he first noticed about 6 weeks ago.  He does not report any specific event just prior to hernia occurrence.  He denies pain, constipation, or nausea associated with the hernia.  He reports the hernia is easily reducible, and spontaneously reduces when he lays down.  He does not report any difficulty urinating.  He does not report any other changes in his health.  He would like to have the hernia repaired before he gets bigger and causes him more problems.    Past Medical History:   Past Medical History:   Diagnosis Date   • Allergic 1960   • Anxiety    • Cataract 2017   • Colon polyp    • Diverticulitis of colon 2006    mild   • Hypertension    • Kidney stones         Past Surgical History:    Past Surgical History:   Procedure Laterality Date   • ADENOIDECTOMY  1956   • COLONOSCOPY N/A 2019    Dr. Jefferson   • KIDNEY STONE SURGERY      multiple   • MENISCECTOMY Left 1964   • SINUS SURGERY  2017   • TONSILLECTOMY  1956   • VASECTOMY  1970       Family History:    Family History   Problem Relation Age of Onset   • Hypertension Mother    • Kidney cancer Mother    • Heart disease Mother    • Heart attack Father    • Hypertension Father    • Cancer Father         Kidney   • No Known Problems Brother        Social History:    Social History     Socioeconomic History   • Marital status:    Tobacco Use   • Smoking status: Never   • Smokeless tobacco: Never   Vaping Use   • Vaping Use: Never used   Substance and Sexual Activity   • Alcohol use: Yes     Alcohol/week: 14.0 standard drinks     Types: 14 Drinks containing 0.5 oz of alcohol per week   • Drug use: No   • Sexual activity: Yes     Partners: Female     Birth control/protection: None     • Semi-retired.  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  • Exercises regularly    Allergies:   No Known Allergies    Medications:     Current Outpatient Medications:   •  amLODIPine-valsartan (EXFORGE)  MG per tablet, Take 1 tablet by mouth Daily., Disp: 90 tablet, Rfl: 1  •  Fexofenadine HCl (ALLEGRA PO), Take 1 tablet by mouth Daily., Disp: , Rfl:   •  fluticasone (FLONASE) 50 MCG/ACT nasal spray, 1 spray into the nostril(s) as directed by provider As Needed for Rhinitis., Disp: , Rfl:   •  Triamcinolone Acetonide 0.05 % ointment, Apply 1 application topically As Needed., Disp: , Rfl:   •  vitamin B-12 (CYANOCOBALAMIN) 1000 MCG tablet, Take 1,000 mcg by mouth Daily., Disp: , Rfl:     Radiology/Endoscopy:    • None    Review of Systems:   Influenza-like illness: no fever, no  cough, no  sore throat, no  body aches, no loss of sense of taste or smell, no known exposure to person with Covid-19.  Constitutional: Negative for fevers or chills  HENT: Negative for hearing loss or runny nose  Eyes: Negative for vision changes or scleral icterus  Respiratory: Negative for cough or shortness of breath  Cardiovascular: Negative for chest pain or heart palpitations  Gastrointestinal: Negative for abdominal pain, nausea, vomiting, constipation, melena, or hematochezia  Genitourinary: Negative for hematuria or dysuria  Musculoskeletal: Negative for joint swelling or gait instability  Neurologic: Negative for tremors or seizures  Psychiatric: Negative for suicidal ideations or depression  All other systems reviewed and negative    Physical Exam:   • Height: 67 inches  • Weight: 60.5 kg  • BMI: 23.6  • Constitutional: Well-developed well-nourished, no acute distress  • Eyes: Conjunctiva normal, sclera nonicteric  • ENMT: Hearing grossly normal, oral mucosa moist  • Neck: Supple, trachea midline  • Respiratory: No increased work of breathing, normal inspiratory effort  • Cardiovascular: Regular rate, no peripheral edema, no jugular venous  distention  • Gastrointestinal: Soft, nontender, small umbilical hernia  • : easily reducible right inguinal hernia, no hernia felt on left  • Lymphatics (palpable nodes):  cervical-negative, axillary-negative  • Skin:  Warm, dry, no rash on visualized skin surfaces  • Musculoskeletal: Symmetric strength, normal gait  • Psychiatric: Alert and oriented ×3, normal affect     Assessment/Plan:  Right inguinal hernia  -I explained the surgical options including open inguinal hernia repair versus laparoscopic inguinal hernia repair. All risks (including bleeding, infection, damage to surrounding structures, mesh folding/moving), benefits, and alternatives were explained to the patient who agreed and wished to proceed. After our discussion, he would like to proceed with laparoscopic inguinal hernia repair.  -We will plan to schedule this in early November after he confirms timing with his wife.      CAROL CACERES M.D.  General, Robotic, and Endoscopic Surgery  [facility]    [address], [address]  [place], [place], [address]  P: [phone]  F: [phone]

## 2022-11-29 ENCOUNTER — PREP FOR SURGERY (OUTPATIENT)
Dept: OTHER | Facility: HOSPITAL | Age: 77
End: 2022-11-29

## 2022-11-29 ENCOUNTER — PRE-ADMISSION TESTING (OUTPATIENT)
Dept: PREADMISSION TESTING | Facility: HOSPITAL | Age: 77
End: 2022-11-29

## 2022-11-29 VITALS
HEART RATE: 73 BPM | RESPIRATION RATE: 16 BRPM | DIASTOLIC BLOOD PRESSURE: 69 MMHG | TEMPERATURE: 98.4 F | SYSTOLIC BLOOD PRESSURE: 155 MMHG | BODY MASS INDEX: 23.37 KG/M2 | WEIGHT: 148.9 LBS | HEIGHT: 67 IN | OXYGEN SATURATION: 98 %

## 2022-11-29 LAB
ANION GAP SERPL CALCULATED.3IONS-SCNC: 9 MMOL/L (ref 5–15)
BUN SERPL-MCNC: 17 MG/DL (ref 8–23)
BUN/CREAT SERPL: 17.9 (ref 7–25)
CALCIUM SPEC-SCNC: 9 MG/DL (ref 8.6–10.5)
CHLORIDE SERPL-SCNC: 105 MMOL/L (ref 98–107)
CO2 SERPL-SCNC: 25 MMOL/L (ref 22–29)
CREAT SERPL-MCNC: 0.95 MG/DL (ref 0.76–1.27)
DEPRECATED RDW RBC AUTO: 39.9 FL (ref 37–54)
EGFRCR SERPLBLD CKD-EPI 2021: 83 ML/MIN/1.73
ERYTHROCYTE [DISTWIDTH] IN BLOOD BY AUTOMATED COUNT: 12 % (ref 12.3–15.4)
GLUCOSE SERPL-MCNC: 104 MG/DL (ref 65–99)
HCT VFR BLD AUTO: 42.6 % (ref 37.5–51)
HGB BLD-MCNC: 14.9 G/DL (ref 13–17.7)
MCH RBC QN AUTO: 31 PG (ref 26.6–33)
MCHC RBC AUTO-ENTMCNC: 35 G/DL (ref 31.5–35.7)
MCV RBC AUTO: 88.8 FL (ref 79–97)
PLATELET # BLD AUTO: 179 10*3/MM3 (ref 140–450)
PMV BLD AUTO: 10 FL (ref 6–12)
POTASSIUM SERPL-SCNC: 3.9 MMOL/L (ref 3.5–5.2)
QT INTERVAL: 411 MS
RBC # BLD AUTO: 4.8 10*6/MM3 (ref 4.14–5.8)
SODIUM SERPL-SCNC: 139 MMOL/L (ref 136–145)
WBC NRBC COR # BLD: 4.98 10*3/MM3 (ref 3.4–10.8)

## 2022-11-29 PROCEDURE — 80048 BASIC METABOLIC PNL TOTAL CA: CPT

## 2022-11-29 PROCEDURE — 85027 COMPLETE CBC AUTOMATED: CPT

## 2022-11-29 PROCEDURE — 36415 COLL VENOUS BLD VENIPUNCTURE: CPT

## 2022-11-29 PROCEDURE — 93010 ELECTROCARDIOGRAM REPORT: CPT | Performed by: INTERNAL MEDICINE

## 2022-11-29 PROCEDURE — 93005 ELECTROCARDIOGRAM TRACING: CPT

## 2022-11-29 RX ORDER — CHLORHEXIDINE GLUCONATE 500 MG/1
CLOTH TOPICAL
COMMUNITY
End: 2022-12-06 | Stop reason: HOSPADM

## 2022-12-06 ENCOUNTER — ANESTHESIA (OUTPATIENT)
Dept: PERIOP | Facility: HOSPITAL | Age: 77
End: 2022-12-06

## 2022-12-06 ENCOUNTER — ANESTHESIA EVENT (OUTPATIENT)
Dept: PERIOP | Facility: HOSPITAL | Age: 77
End: 2022-12-06

## 2022-12-06 ENCOUNTER — HOSPITAL ENCOUNTER (OUTPATIENT)
Facility: HOSPITAL | Age: 77
Setting detail: HOSPITAL OUTPATIENT SURGERY
Discharge: HOME | End: 2022-12-06
Attending: SURGERY | Admitting: SURGERY

## 2022-12-06 VITALS
RESPIRATION RATE: 16 BRPM | DIASTOLIC BLOOD PRESSURE: 77 MMHG | BODY MASS INDEX: 23.32 KG/M2 | HEIGHT: 67 IN | TEMPERATURE: 97.7 F | HEART RATE: 81 BPM | OXYGEN SATURATION: 95 % | SYSTOLIC BLOOD PRESSURE: 122 MMHG

## 2022-12-06 DIAGNOSIS — K40.90 NON-RECURRENT UNILATERAL INGUINAL HERNIA WITHOUT OBSTRUCTION OR GANGRENE: ICD-10-CM

## 2022-12-06 PROCEDURE — 25010000002 CEFAZOLIN IN DEXTROSE 2-4 GM/100ML-% SOLUTION: Performed by: SURGERY

## 2022-12-06 PROCEDURE — 25010000002 NEOSTIGMINE 5 MG/10ML SOLUTION: Performed by: NURSE ANESTHETIST, CERTIFIED REGISTERED

## 2022-12-06 PROCEDURE — 25010000002 FENTANYL CITRATE (PF) 50 MCG/ML SOLUTION: Performed by: NURSE ANESTHETIST, CERTIFIED REGISTERED

## 2022-12-06 PROCEDURE — 25010000002 ONDANSETRON PER 1 MG: Performed by: NURSE ANESTHETIST, CERTIFIED REGISTERED

## 2022-12-06 PROCEDURE — 25010000002 DEXAMETHASONE PER 1 MG: Performed by: NURSE ANESTHETIST, CERTIFIED REGISTERED

## 2022-12-06 PROCEDURE — 49650 LAP ING HERNIA REPAIR INIT: CPT | Performed by: SURGERY

## 2022-12-06 PROCEDURE — S0260 H&P FOR SURGERY: HCPCS | Performed by: SURGERY

## 2022-12-06 PROCEDURE — C1781 MESH (IMPLANTABLE): HCPCS | Performed by: SURGERY

## 2022-12-06 PROCEDURE — 25010000002 MIDAZOLAM PER 1 MG: Performed by: ANESTHESIOLOGY

## 2022-12-06 PROCEDURE — 25010000002 PROPOFOL 10 MG/ML EMULSION: Performed by: NURSE ANESTHETIST, CERTIFIED REGISTERED

## 2022-12-06 DEVICE — BARD MESH
Type: IMPLANTABLE DEVICE | Site: ABDOMEN | Status: FUNCTIONAL
Brand: BARD MESH

## 2022-12-06 DEVICE — HEMOLOK ML 6 CLIPS/CART
Type: IMPLANTABLE DEVICE | Site: ABDOMEN | Status: FUNCTIONAL
Brand: WECK

## 2022-12-06 DEVICE — FIXATION DEVICE;15 VIOLET ABSORBABLE TACKS
Type: IMPLANTABLE DEVICE | Site: ABDOMEN | Status: FUNCTIONAL
Brand: ABSORBATACK

## 2022-12-06 RX ORDER — NEOSTIGMINE METHYLSULFATE 0.5 MG/ML
INJECTION, SOLUTION INTRAVENOUS AS NEEDED
Status: DISCONTINUED | OUTPATIENT
Start: 2022-12-06 | End: 2022-12-06 | Stop reason: SURG

## 2022-12-06 RX ORDER — PROMETHAZINE HYDROCHLORIDE 25 MG/1
25 TABLET ORAL ONCE AS NEEDED
Status: DISCONTINUED | OUTPATIENT
Start: 2022-12-06 | End: 2022-12-06 | Stop reason: HOSPADM

## 2022-12-06 RX ORDER — PROPOFOL 10 MG/ML
VIAL (ML) INTRAVENOUS AS NEEDED
Status: DISCONTINUED | OUTPATIENT
Start: 2022-12-06 | End: 2022-12-06 | Stop reason: SURG

## 2022-12-06 RX ORDER — DROPERIDOL 2.5 MG/ML
0.62 INJECTION, SOLUTION INTRAMUSCULAR; INTRAVENOUS ONCE AS NEEDED
Status: DISCONTINUED | OUTPATIENT
Start: 2022-12-06 | End: 2022-12-06 | Stop reason: HOSPADM

## 2022-12-06 RX ORDER — CEFAZOLIN SODIUM 2 G/100ML
2 INJECTION, SOLUTION INTRAVENOUS ONCE
Status: COMPLETED | OUTPATIENT
Start: 2022-12-06 | End: 2022-12-06

## 2022-12-06 RX ORDER — ACETAMINOPHEN 325 MG/1
650 TABLET ORAL ONCE AS NEEDED
Status: DISCONTINUED | OUTPATIENT
Start: 2022-12-06 | End: 2022-12-06 | Stop reason: HOSPADM

## 2022-12-06 RX ORDER — SODIUM CHLORIDE 0.9 % (FLUSH) 0.9 %
3 SYRINGE (ML) INJECTION EVERY 12 HOURS SCHEDULED
Status: DISCONTINUED | OUTPATIENT
Start: 2022-12-06 | End: 2022-12-06 | Stop reason: HOSPADM

## 2022-12-06 RX ORDER — ONDANSETRON 2 MG/ML
4 INJECTION INTRAMUSCULAR; INTRAVENOUS ONCE AS NEEDED
Status: COMPLETED | OUTPATIENT
Start: 2022-12-06 | End: 2022-12-06

## 2022-12-06 RX ORDER — LIDOCAINE HYDROCHLORIDE 20 MG/ML
INJECTION, SOLUTION INTRAVENOUS AS NEEDED
Status: DISCONTINUED | OUTPATIENT
Start: 2022-12-06 | End: 2022-12-06 | Stop reason: SURG

## 2022-12-06 RX ORDER — DEXAMETHASONE SODIUM PHOSPHATE 4 MG/ML
INJECTION, SOLUTION INTRA-ARTICULAR; INTRALESIONAL; INTRAMUSCULAR; INTRAVENOUS; SOFT TISSUE AS NEEDED
Status: DISCONTINUED | OUTPATIENT
Start: 2022-12-06 | End: 2022-12-06 | Stop reason: SURG

## 2022-12-06 RX ORDER — FAMOTIDINE 10 MG/ML
20 INJECTION, SOLUTION INTRAVENOUS ONCE
Status: COMPLETED | OUTPATIENT
Start: 2022-12-06 | End: 2022-12-06

## 2022-12-06 RX ORDER — ROCURONIUM BROMIDE 10 MG/ML
INJECTION, SOLUTION INTRAVENOUS AS NEEDED
Status: DISCONTINUED | OUTPATIENT
Start: 2022-12-06 | End: 2022-12-06 | Stop reason: SURG

## 2022-12-06 RX ORDER — FLUMAZENIL 0.1 MG/ML
0.2 INJECTION INTRAVENOUS AS NEEDED
Status: DISCONTINUED | OUTPATIENT
Start: 2022-12-06 | End: 2022-12-06 | Stop reason: HOSPADM

## 2022-12-06 RX ORDER — FENTANYL CITRATE 50 UG/ML
50 INJECTION, SOLUTION INTRAMUSCULAR; INTRAVENOUS
Status: DISCONTINUED | OUTPATIENT
Start: 2022-12-06 | End: 2022-12-06 | Stop reason: HOSPADM

## 2022-12-06 RX ORDER — EPHEDRINE SULFATE 50 MG/ML
INJECTION INTRAVENOUS AS NEEDED
Status: DISCONTINUED | OUTPATIENT
Start: 2022-12-06 | End: 2022-12-06 | Stop reason: SURG

## 2022-12-06 RX ORDER — PROMETHAZINE HYDROCHLORIDE 25 MG/1
25 SUPPOSITORY RECTAL ONCE AS NEEDED
Status: DISCONTINUED | OUTPATIENT
Start: 2022-12-06 | End: 2022-12-06 | Stop reason: HOSPADM

## 2022-12-06 RX ORDER — HYDROCODONE BITARTRATE AND ACETAMINOPHEN 5; 325 MG/1; MG/1
1 TABLET ORAL EVERY 6 HOURS PRN
Qty: 15 TABLET | Refills: 0 | Status: SHIPPED | OUTPATIENT
Start: 2022-12-06 | End: 2022-12-20

## 2022-12-06 RX ORDER — MIDAZOLAM HYDROCHLORIDE 1 MG/ML
0.5 INJECTION INTRAMUSCULAR; INTRAVENOUS
Status: DISCONTINUED | OUTPATIENT
Start: 2022-12-06 | End: 2022-12-06 | Stop reason: HOSPADM

## 2022-12-06 RX ORDER — HYDROMORPHONE HYDROCHLORIDE 1 MG/ML
0.5 INJECTION, SOLUTION INTRAMUSCULAR; INTRAVENOUS; SUBCUTANEOUS
Status: DISCONTINUED | OUTPATIENT
Start: 2022-12-06 | End: 2022-12-06 | Stop reason: HOSPADM

## 2022-12-06 RX ORDER — SODIUM CHLORIDE 0.9 % (FLUSH) 0.9 %
3-10 SYRINGE (ML) INJECTION AS NEEDED
Status: DISCONTINUED | OUTPATIENT
Start: 2022-12-06 | End: 2022-12-06 | Stop reason: HOSPADM

## 2022-12-06 RX ORDER — BUPIVACAINE HYDROCHLORIDE AND EPINEPHRINE 5; 5 MG/ML; UG/ML
INJECTION, SOLUTION EPIDURAL; INTRACAUDAL; PERINEURAL AS NEEDED
Status: DISCONTINUED | OUTPATIENT
Start: 2022-12-06 | End: 2022-12-06 | Stop reason: HOSPADM

## 2022-12-06 RX ORDER — ONDANSETRON 4 MG/1
4 TABLET, FILM COATED ORAL EVERY 8 HOURS PRN
Qty: 20 TABLET | Refills: 0 | Status: SHIPPED | OUTPATIENT
Start: 2022-12-06 | End: 2022-12-13

## 2022-12-06 RX ORDER — SODIUM CHLORIDE, SODIUM LACTATE, POTASSIUM CHLORIDE, CALCIUM CHLORIDE 600; 310; 30; 20 MG/100ML; MG/100ML; MG/100ML; MG/100ML
9 INJECTION, SOLUTION INTRAVENOUS CONTINUOUS
Status: DISCONTINUED | OUTPATIENT
Start: 2022-12-06 | End: 2022-12-06 | Stop reason: HOSPADM

## 2022-12-06 RX ORDER — HYDRALAZINE HYDROCHLORIDE 20 MG/ML
5 INJECTION INTRAMUSCULAR; INTRAVENOUS
Status: DISCONTINUED | OUTPATIENT
Start: 2022-12-06 | End: 2022-12-06 | Stop reason: HOSPADM

## 2022-12-06 RX ORDER — LIDOCAINE HYDROCHLORIDE 10 MG/ML
0.5 INJECTION, SOLUTION EPIDURAL; INFILTRATION; INTRACAUDAL; PERINEURAL ONCE AS NEEDED
Status: DISCONTINUED | OUTPATIENT
Start: 2022-12-06 | End: 2022-12-06 | Stop reason: HOSPADM

## 2022-12-06 RX ORDER — ACETAMINOPHEN 650 MG/1
650 SUPPOSITORY RECTAL ONCE AS NEEDED
Status: DISCONTINUED | OUTPATIENT
Start: 2022-12-06 | End: 2022-12-06 | Stop reason: HOSPADM

## 2022-12-06 RX ORDER — PHENYLEPHRINE HCL IN 0.9% NACL 1 MG/10 ML
SYRINGE (ML) INTRAVENOUS AS NEEDED
Status: DISCONTINUED | OUTPATIENT
Start: 2022-12-06 | End: 2022-12-06 | Stop reason: SURG

## 2022-12-06 RX ORDER — NALOXONE HCL 0.4 MG/ML
0.2 VIAL (ML) INJECTION AS NEEDED
Status: DISCONTINUED | OUTPATIENT
Start: 2022-12-06 | End: 2022-12-06 | Stop reason: HOSPADM

## 2022-12-06 RX ORDER — DROPERIDOL 2.5 MG/ML
0.62 INJECTION, SOLUTION INTRAMUSCULAR; INTRAVENOUS
Status: DISCONTINUED | OUTPATIENT
Start: 2022-12-06 | End: 2022-12-06 | Stop reason: HOSPADM

## 2022-12-06 RX ORDER — HYDROCODONE BITARTRATE AND ACETAMINOPHEN 5; 325 MG/1; MG/1
1 TABLET ORAL ONCE AS NEEDED
Status: COMPLETED | OUTPATIENT
Start: 2022-12-06 | End: 2022-12-06

## 2022-12-06 RX ORDER — LABETALOL HYDROCHLORIDE 5 MG/ML
5 INJECTION, SOLUTION INTRAVENOUS
Status: DISCONTINUED | OUTPATIENT
Start: 2022-12-06 | End: 2022-12-06 | Stop reason: HOSPADM

## 2022-12-06 RX ORDER — MAGNESIUM HYDROXIDE 1200 MG/15ML
LIQUID ORAL AS NEEDED
Status: DISCONTINUED | OUTPATIENT
Start: 2022-12-06 | End: 2022-12-06 | Stop reason: HOSPADM

## 2022-12-06 RX ORDER — EPHEDRINE SULFATE 50 MG/ML
5 INJECTION, SOLUTION INTRAVENOUS ONCE AS NEEDED
Status: DISCONTINUED | OUTPATIENT
Start: 2022-12-06 | End: 2022-12-06 | Stop reason: HOSPADM

## 2022-12-06 RX ORDER — FENTANYL CITRATE 50 UG/ML
INJECTION, SOLUTION INTRAMUSCULAR; INTRAVENOUS AS NEEDED
Status: DISCONTINUED | OUTPATIENT
Start: 2022-12-06 | End: 2022-12-06 | Stop reason: SURG

## 2022-12-06 RX ORDER — GLYCOPYRROLATE 0.2 MG/ML
INJECTION INTRAMUSCULAR; INTRAVENOUS AS NEEDED
Status: DISCONTINUED | OUTPATIENT
Start: 2022-12-06 | End: 2022-12-06 | Stop reason: SURG

## 2022-12-06 RX ORDER — ONDANSETRON 2 MG/ML
INJECTION INTRAMUSCULAR; INTRAVENOUS AS NEEDED
Status: DISCONTINUED | OUTPATIENT
Start: 2022-12-06 | End: 2022-12-06 | Stop reason: SURG

## 2022-12-06 RX ADMIN — ROCURONIUM BROMIDE 50 MG: 10 INJECTION, SOLUTION INTRAVENOUS at 15:44

## 2022-12-06 RX ADMIN — Medication 200 MCG: at 16:34

## 2022-12-06 RX ADMIN — ROCURONIUM BROMIDE 5 MG: 10 INJECTION, SOLUTION INTRAVENOUS at 16:55

## 2022-12-06 RX ADMIN — FENTANYL CITRATE 25 MCG: 0.05 INJECTION, SOLUTION INTRAMUSCULAR; INTRAVENOUS at 17:16

## 2022-12-06 RX ADMIN — FAMOTIDINE 20 MG: 10 INJECTION INTRAVENOUS at 11:52

## 2022-12-06 RX ADMIN — GLYCOPYRROLATE 0.4 MG: 0.2 INJECTION INTRAMUSCULAR; INTRAVENOUS at 17:14

## 2022-12-06 RX ADMIN — FENTANYL CITRATE 25 MCG: 0.05 INJECTION, SOLUTION INTRAMUSCULAR; INTRAVENOUS at 15:55

## 2022-12-06 RX ADMIN — Medication 200 MCG: at 16:04

## 2022-12-06 RX ADMIN — NEOSTIGMINE METHYLSULFATE 4 MG: 0.5 INJECTION INTRAVENOUS at 17:14

## 2022-12-06 RX ADMIN — SODIUM CHLORIDE, POTASSIUM CHLORIDE, SODIUM LACTATE AND CALCIUM CHLORIDE 9 ML/HR: 600; 310; 30; 20 INJECTION, SOLUTION INTRAVENOUS at 11:52

## 2022-12-06 RX ADMIN — LIDOCAINE HYDROCHLORIDE 100 MG: 20 INJECTION, SOLUTION INTRAVENOUS at 15:44

## 2022-12-06 RX ADMIN — DEXAMETHASONE SODIUM PHOSPHATE 6 MG: 4 INJECTION, SOLUTION INTRA-ARTICULAR; INTRALESIONAL; INTRAMUSCULAR; INTRAVENOUS; SOFT TISSUE at 15:52

## 2022-12-06 RX ADMIN — EPHEDRINE SULFATE 10 MG: 50 INJECTION INTRAVENOUS at 16:57

## 2022-12-06 RX ADMIN — CEFAZOLIN SODIUM 2 G: 2 INJECTION, SOLUTION INTRAVENOUS at 15:32

## 2022-12-06 RX ADMIN — ONDANSETRON 4 MG: 2 INJECTION INTRAMUSCULAR; INTRAVENOUS at 17:57

## 2022-12-06 RX ADMIN — MIDAZOLAM 0.5 MG: 1 INJECTION INTRAMUSCULAR; INTRAVENOUS at 11:52

## 2022-12-06 RX ADMIN — ONDANSETRON 4 MG: 2 INJECTION INTRAMUSCULAR; INTRAVENOUS at 16:45

## 2022-12-06 RX ADMIN — PROPOFOL 170 MG: 10 INJECTION, EMULSION INTRAVENOUS at 15:44

## 2022-12-06 RX ADMIN — SODIUM CHLORIDE, POTASSIUM CHLORIDE, SODIUM LACTATE AND CALCIUM CHLORIDE: 600; 310; 30; 20 INJECTION, SOLUTION INTRAVENOUS at 16:49

## 2022-12-06 RX ADMIN — HYDROCODONE BITARTRATE AND ACETAMINOPHEN 1 TABLET: 5; 325 TABLET ORAL at 18:05

## 2022-12-06 RX ADMIN — FENTANYL CITRATE 50 MCG: 0.05 INJECTION, SOLUTION INTRAMUSCULAR; INTRAVENOUS at 17:19

## 2022-12-06 RX ADMIN — Medication 200 MCG: at 16:19

## 2022-12-06 RX ADMIN — Medication 200 MCG: at 16:44

## 2022-12-06 NOTE — ANESTHESIA POSTPROCEDURE EVALUATION
"Patient: Santy Burns    Procedure Summary     Date: 12/06/22 Room / Location: [facility] OR  / [facility] MAIN OR    Anesthesia Start: 1537 Anesthesia Stop: 1731    Procedure: LAPAROSCOPIC RIGHT INGUINAL HERNIA REPAIR WITH MESH (Right: Abdomen) Diagnosis:       Non-recurrent unilateral inguinal hernia without obstruction or gangrene      (Non-recurrent unilateral inguinal hernia without obstruction or gangrene [K40.90])    Surgeons: Klarissa Gipson MD Provider: Isra Gallego MD    Anesthesia Type: general ASA Status: 2          Anesthesia Type: general    Vitals  Vitals Value Taken Time   /65 12/06/22 1801   Temp 36.5 °C (97.7 °F) 12/06/22 1730   Pulse 79 12/06/22 1809   Resp 16 12/06/22 1800   SpO2 95 % 12/06/22 1809   Vitals shown include unvalidated device data.        Post Anesthesia Care and Evaluation    Patient location during evaluation: bedside  Patient participation: complete - patient participated  Level of consciousness: awake and alert  Pain management: adequate    Airway patency: patent  Anesthetic complications: No anesthetic complications    Cardiovascular status: acceptable  Respiratory status: acceptable  Hydration status: acceptable    Comments: /66   Pulse 73   Temp 36.5 °C (97.7 °F) (Oral)   Resp 16   Ht 170.2 cm (67\")   SpO2 94%   BMI 23.32 kg/m²     Patient is stable postoperatively and has adequately recovered from anesthesia as described above unless otherwise noted      "

## 2022-12-06 NOTE — ANESTHESIA PROCEDURE NOTES
Airway  Urgency: elective    Date/Time: 12/6/2022 3:48 PM  Airway not difficult    General Information and Staff    Patient location during procedure: OR  Anesthesiologist: Robert Benitez MD  CRNA/CAA: Radha Marie CRNA    Indications and Patient Condition  Indications for airway management: airway protection    Preoxygenated: yes (pt pre-O2 with 100% O2)  Mask difficulty assessment: 2 - vent by mask + OA or adjuvant +/- NMBA (easy BMV )    Final Airway Details  Final airway type: endotracheal airway      Successful airway: ETT  Cuffed: yes   Successful intubation technique: direct laryngoscopy  Endotracheal tube insertion site: oral  Blade: Jagdeep  Blade size: 3  ETT size (mm): 7.5  Cormack-Lehane Classification: grade I - full view of glottis  Placement verified by: chest auscultation and capnometry   Cuff volume (mL): 7  Measured from: lips  ETT/EBT  to lips (cm): 23  Number of attempts at approach: 2  Assessment: lips, teeth, and gum same as pre-op and atraumatic intubation    Additional Comments  No change in dentition.

## 2022-12-06 NOTE — H&P
General Surgery H&P    Referring Provider: Klarissa Gipson MD    Chief Complaint:    Right inguinal hernia     History of Present Illness:    Santy Burns is a very pleasant 77 y.o. male with a right inguinal hernia, which he first noticed in September.  He does not report any specific event just prior to hernia occurrence.  He denies pain, constipation, or nausea associated with the hernia.  He reports the hernia is easily reducible, and spontaneously reduces when he lays down.  He does not report any difficulty urinating.  He does not report any other changes in his health.  He would like to have the hernia repaired before he gets bigger and causes him more problems.    Past Medical History:   Past Medical History:   Diagnosis Date   • Allergic 1960   • Anxiety    • Cataract 2017   • Colon polyp    • Diverticulitis of colon 2006    mild   • Hypertension    • Kidney stones         Past Surgical History:    Past Surgical History:   Procedure Laterality Date   • ADENOIDECTOMY  1956   • COLONOSCOPY N/A 2019    Dr. Jefferson   • KIDNEY STONE SURGERY      multiple   • KNEE ARTHROSCOPY W/ MENISCAL REPAIR Left    • MENISCECTOMY Left 1964   • SINUS SURGERY  2017   • TONSILLECTOMY  1956   • VASECTOMY  1970       Family History:    Family History   Problem Relation Age of Onset   • Hypertension Mother    • Kidney cancer Mother    • Heart disease Mother    • Heart attack Father    • Hypertension Father    • Cancer Father         Kidney   • No Known Problems Brother    • Malig Hyperthermia Neg Hx        Social History:    Social History     Socioeconomic History   • Marital status:    Tobacco Use   • Smoking status: Never   • Smokeless tobacco: Never   Vaping Use   • Vaping Use: Never used   Substance and Sexual Activity   • Alcohol use: Yes     Alcohol/week: 14.0 standard drinks     Types: 14 Drinks containing 0.5 oz of alcohol per week   • Drug use: No   • Sexual activity: Yes     Partners: Female     Birth  control/protection: None     • Denies tobacco use  • Occasional alcohol use    Allergies:   No Known Allergies    Medications:     Current Facility-Administered Medications:   •  ceFAZolin in dextrose (ANCEF) IVPB solution 2 g, 2 g, Intravenous, Once, Klarissa Gipson MD  •  lactated ringers infusion, 9 mL/hr, Intravenous, Continuous, Robert Ferris MD, Last Rate: 9 mL/hr at 12/06/22 1251, Currently Infusing at 12/06/22 1251  •  lidocaine PF 1% (XYLOCAINE) injection 0.5 mL, 0.5 mL, Injection, Once PRN, Robert Ferris MD  •  midazolam (VERSED) injection 0.5 mg, 0.5 mg, Intravenous, Q10 Min PRN, Robert Ferirs MD, 0.5 mg at 12/06/22 1152  •  sodium chloride 0.9 % flush 3 mL, 3 mL, Intravenous, Q12H, Robert Ferris MD  •  sodium chloride 0.9 % flush 3-10 mL, 3-10 mL, Intravenous, PRN, Robert Ferris MD    Review of Systems:   Influenza-like illness: no fever, no  cough, no  sore throat, no  body aches, no loss of sense of taste or smell, no known exposure to person with Covid-19.  Constitutional: Negative for fevers or chills  HENT: Negative for hearing loss or runny nose  Eyes: Negative for vision changes or scleral icterus  Respiratory: Negative for cough or shortness of breath  Cardiovascular: Negative for chest pain or heart palpitations  Gastrointestinal: Negative for abdominal pain, nausea, vomiting, constipation, melena, or hematochezia  Genitourinary: Negative for hematuria or dysuria  Musculoskeletal: Negative for joint swelling or gait instability  Neurologic: Negative for tremors or seizures  Psychiatric: Negative for suicidal ideations or depression  All other systems reviewed and negative    Physical Exam:   • BMI: 23  • Constitutional: Well-developed well-nourished, no acute distress  • Eyes: Conjunctiva normal, sclera nonicteric  • ENMT: Hearing grossly normal, oral mucosa moist  • Neck: Supple, trachea midline  • Respiratory: No increased work of breathing, normal  inspiratory effort  • Cardiovascular: Regular rate, no peripheral edema, no jugular venous distention  • Gastrointestinal: Soft, nontender, right inguinal hernia  • Lymphatics (palpable nodes):  cervical-negative, axillary-negative  • Skin:  Warm, dry, no rash on visualized skin surfaces  • Musculoskeletal: Symmetric strength, normal gait  • Psychiatric: Alert and oriented ×3, normal affect     Assessment/Plan:  1.  Right inguinal hernia  - After hearing the options for laparoscopic versus open repair the patient elected to proceed with laparoscopic right inguinal hernia repair. All risks (including bleeding, infection, damage to surrounding structures), benefits, and alternatives were explained to the patient who agreed and wished to proceed.      CAROL CACERES M.D.  General, Robotic, and Endoscopic Surgery  [facility]    [address], [address]  [place], [place], [address]  P: [phone]  F: [phone]

## 2022-12-06 NOTE — DISCHARGE INSTRUCTIONS
Dr. Klarissa Gipson  [address] [facility] [address]  [place] [id number] [phone]    Discharge Instructions for Hernia Surgery    Go home, rest and take it easy today; however, you should get up and move about several times today to reduce the risk of developing a clot in your legs.      You may experience some dizziness or memory loss from the anesthesia.  This may last for the next 24 hours.  Someone should plan on staying with you for the first 24 hours for your safety.    Do not make any important legal decisions or sign any legal papers for the next 24 hours.      Eat and drink lightly today.  Start off with liquids, jello, soup, crackers or other bland foods at first. You may advance your diet tomorrow as tolerated as long as you do not experience any nausea or vomiting.     If skin glue (Dermabond) was used, your incisions are protected and covered.  The invisible glue will dissolve on its own as your incision heals. If dressings were used, you may remove your outer dressings in 3 days.  The white tapes called steri-strips should stay in place.  They will fall off on their own in 1-2 weeks.  Do not worry if they come off sooner.      If dressings were used, you may notice some bleeding/drainage on your outer dressings. A little bloody drainage is normal. If the bleeding/drainage is such that the bandage cannot absorb it, remove the dressing, apply clean gauze and apply firm pressure for a full 15 minutes.  If the bleeding continues, please call me.    You may shower tomorrow allowing water to run over the incisions; however, do not scrub the incisions.  No tub baths until your incisions are completely healed.      No lifting > 20 lbs. until you are seen at your follow-up visit.         You have received a prescription for a narcotic pain medicine, as you will have some pain following surgery.   You will not be totally pain free, but your pain medicine should make the pain tolerable.  Please take your pain  medicine as prescribed and always take your pills with food to prevent nausea. If you are having severe pain that cannot be controlled by the pain medicine, please contact me.      Narcotic pain medicine can cause constipation. Take an over the counter stool softener, like Miaralax or docusate to prevent constipation while taking narcotics.    You have also received a prescription for an anti-nausea medicine.  Please take this as prescribed for any nausea or vomiting.  Nausea could be a result of the anesthesia or a result of the narcotic pain medicine.  If you experience severe nausea and vomiting that cannot be controlled by the nausea medicine, please call me.      If you had a laparoscopic surgery, it is not unusual to experience pain/discomfort in your shoulders or under your ribs after surgery.  It is from the gas used during the laparoscopic procedure and usually lasts 1-3 days.  The prescription pain medicine is used to treat the surgical pain and does not typically alleviate this “gassy” pain.     No driving for 24 hours and for as long as you are taking your prescription pain medicine.    You will need to call the office at [phone] to schedule a follow-up appointment in 10-14 days.     Remember to contact me for any of the following:    Fever > 101 degrees  Severe pain that cannot be controlled by taking your pain pills  Severe nausea or vomiting that cannot be controlled by taking your nausea pills  Significant bleeding of your incisions  Drainage that has a bad smell or is yellow or green in appearance  Any other questions or concerns      Additional Instruction for Inguinal Hernia Patients Only    If you did not urinate at the hospital after your surgery or if you feel the need to urinate and cannot, this will necessitate a return to the Emergency Room for placement of a urinary catheter.  You should also notify me as well.  As a rule, you should be able to empty your bladder within 4-6 hours after  discharge from the hospital.      You may notice some scrotal bruising and/or swelling. A scrotal support or briefs as well as ice packs may be used to alleviate discomfort.

## 2022-12-06 NOTE — ANESTHESIA PREPROCEDURE EVALUATION
Anesthesia Evaluation     NPO Solid Status: > 8 hours             Airway   Mallampati: II  TM distance: >3 FB  Neck ROM: full  no difficulty expected  Dental - normal exam     Pulmonary - normal exam   Cardiovascular - normal exam    (+) hypertension,       Neuro/Psych  (+) psychiatric history Anxiety,    GI/Hepatic/Renal/Endo      Musculoskeletal     Abdominal  - normal exam   Substance History      OB/GYN          Other                        Anesthesia Plan    ASA 2     general     intravenous induction     Anesthetic plan, risks, benefits, and alternatives have been provided, discussed and informed consent has been obtained with: patient.        CODE STATUS:

## 2022-12-06 NOTE — OP NOTE
Operative Note :  Klarissa Gipson MD      Santy Burns  1945    Procedure Date: 12/06/22    Pre-op Diagnosis:  Non-recurrent unilateral inguinal hernia without obstruction or gangrene [K40.90]    Post-Operative Diagnosis:  Post-Op Diagnosis Codes:     * Non-recurrent unilateral inguinal hernia without obstruction or gangrene [K40.90]    Procedure:   Laparoscopic transperitoneal right inguinal hernia repair with mesh    Surgeon: Klarissa Gipson MD    Assistant: Dr. Roe Rm, who's assistance was necessary for the completion of this case.    Anesthesia:  General (general endotracheal tube)    Estimated Blood Loss: minimal    Implants: Ethicon 6x6cm mesh, cut to shape    Complications: None    Indications:  Mr. Santy Burns is a 77 year old male who presented to my clinic for evaluation of a right inguinal hernia.  On physical exam, he was seen to have a right inguinal hernia.  There is no evidence of herniation on the left.  The risks and benefits of open, conservative and laparoscopic management were discussed at length and in detail with the patient.  He has chosen to undergo laparoscopic repair.     Description of procedure:  The patient was brought to the operating room in stable condition.   Preoperative antibiotics were given and sequential compression devices were applied.  At this time, the patient was laid supine on the operating room table.  General anesthesia was induced by the Anesthesia service without difficulty.  The patient's abdomen was prepped and draped in the usual sterile fashion.       At this time, the patient's abdomen was accessed at the level of the umbilicus.  Half percent Marcaine was injected into the subcutaneous tissue.  An incision was made and then the Veress needle was used to insufflate the abdomen.  An Optiview trocar was used to insert a 5 mm 0 degree scope into the abdomen.  Brief survey of the abdominal cavity revealed no intra-abdominal injury, no herniation on  the left and a direct hernia on the right.  The operation was continued by insertion of 2 additional trocars, bilaterally in the mid clavicular line at the level of the umbilicus.  These were inserted under direct view.  A large peritoneal flap was then raised from the level of the anterior superior iliac spine laterally to the umbilical ligament medially at a level approximately 3 cm above the indirect hernia sac.  The medial space was developed first, followed by the lateral, using blunt dissection.  The medial dissection exposed the midline space of Retzius as well as Scooter ligament.  The direct hernia sac was then gently dissected from the surrounding structures.  The cord structures were peritonealized back to the level of the genu of the vas deferens.       Next, the mesh was inserted into the abdomen.  It was tacked once superior to the pubic tubercle, then once to Scooter ligament then tacked once medial to the epigastric vessels  and once laterally.  The mesh was then reperitonealized by bringing the peritoneum up and over the mesh and ensuring that the mesh lay in good position.  The peritoneum was closed using Hemolock clips.   Next, the abdomen was inspected.  The field was completely cleaned with no evidence of intra-abdominal injury or bleeding.  All trocars were then removed under direct vision.  The 11mm port site fascia was closed with Vicryl suture.  All skin incisions were closed with 5-0 vicryl and surgical glue.  All sponge, needle and instrument counts were correct at the conclusion. The patient was extubated in the recovery room in stable condition.      CAROL CACERES MD  General, Robotic, and Endoscopic Surgery  [facility]    [address], [address]  [place] [address]  P: [phone]  F: [phone]

## 2022-12-20 ENCOUNTER — OFFICE VISIT (OUTPATIENT)
Dept: SURGERY | Facility: CLINIC | Age: 77
End: 2022-12-20

## 2022-12-20 DIAGNOSIS — K40.90 NON-RECURRENT UNILATERAL INGUINAL HERNIA WITHOUT OBSTRUCTION OR GANGRENE: Primary | ICD-10-CM

## 2022-12-20 PROCEDURE — 99024 POSTOP FOLLOW-UP VISIT: CPT | Performed by: SURGERY

## 2022-12-20 NOTE — PROGRESS NOTES
General Surgery Post-Operative Follow Up Note     History of Present Illness:    Santy Burns is a 77 y.o. year old male who presents for post-operative follow up from laparoscopic right inguinal hernia repair.  He reports minimal pain postoperatively.  Occasionally, when he bends a certain way will feel a little discomfort.  No other complaints or concerns    Procedure:    • Laparoscopic right inguinal hernia repair    Pathology:    • None    Physical Exam:   • Constitutional: Well-developed well-nourished, no acute distress  • Eyes: Conjunctiva normal, sclera nonicteric  • ENMT: Hearing grossly normal, oral mucosa moist  • Respiratory: No increased work of breathing, normal inspiratory effort  • Cardiovascular: Regular rate, no peripheral edema, no jugular venous distention  • Gastrointestinal: Soft, nontender, incisions clean, dry and intact, some ecchymosis around the left sided incision, no palpable hernia  • Skin:  Warm, dry, no rash on visualized skin surfaces  • Musculoskeletal: Symmetric strength, normal gait  • Psychiatric: Alert and oriented ×3, normal affect       Assessment/Plan:  Status post laparoscopic right inguinal hernia  -Okay to resume regular lifting and all activities  -Follow-up as needed      Klarissa Gipson M.D.  General, Robotic and Endoscopic Surgery  [facility] [place]    [address], [address]  [place], [place], [address]  P: [phone]  F: [phone]

## 2023-01-12 ENCOUNTER — OFFICE VISIT (OUTPATIENT)
Dept: INTERNAL MEDICINE | Facility: CLINIC | Age: 78
End: 2023-01-12
Payer: COMMERCIAL

## 2023-01-12 VITALS
SYSTOLIC BLOOD PRESSURE: 140 MMHG | HEIGHT: 67 IN | DIASTOLIC BLOOD PRESSURE: 62 MMHG | HEART RATE: 66 BPM | WEIGHT: 149 LBS | OXYGEN SATURATION: 97 % | TEMPERATURE: 97.6 F | BODY MASS INDEX: 23.39 KG/M2

## 2023-01-12 DIAGNOSIS — M65.312 TRIGGER FINGER OF LEFT THUMB: Primary | ICD-10-CM

## 2023-01-12 PROCEDURE — 99213 OFFICE O/P EST LOW 20 MIN: CPT | Performed by: NURSE PRACTITIONER

## 2023-01-12 NOTE — PROGRESS NOTES
"Subjective   Santy Burns is a 77 y.o. male. Patient is here today for   Chief Complaint   Patient presents with   • Arthritis     Patient complains of a \"trigger\" thumb, locking thumbs, as the day goes on it doesn't hurt,hurts on on bending.    .    History of Present Illness   C/o thumb \"clicking\" for about 2 months. It is uncomfortable, but denies pain.     The following portions of the patient's history were reviewed and updated as appropriate: allergies, current medications, past family history, past medical history, past social history, past surgical history and problem list.    Review of Systems    Objective   Vitals:    01/12/23 1426   BP: 140/62   Pulse: 66   Temp: 97.6 °F (36.4 °C)   SpO2: 97%     Body mass index is 23.33 kg/m².  Physical Exam  Vitals and nursing note reviewed.   Constitutional:       Appearance: Normal appearance. He is well-developed.   Cardiovascular:      Rate and Rhythm: Normal rate.   Pulmonary:      Effort: Pulmonary effort is normal.   Musculoskeletal:      Comments: Left trigger thumb; arthritis MP joint   Skin:     General: Skin is warm and dry.   Neurological:      Mental Status: He is alert and oriented to person, place, and time.   Psychiatric:         Speech: Speech normal.         Behavior: Behavior normal.         Thought Content: Thought content normal.         Assessment & Plan   Diagnoses and all orders for this visit:    1. Trigger finger of left thumb (Primary)  -     Ambulatory Referral to Hand Surgery      Will be referred to hand surgery for treatment           "

## 2023-03-08 ENCOUNTER — TELEPHONE (OUTPATIENT)
Dept: INTERNAL MEDICINE | Facility: CLINIC | Age: 78
End: 2023-03-08

## 2023-03-08 NOTE — TELEPHONE ENCOUNTER
Caller: Santy Burns    Relationship to patient: Self    Best call back number: [phone]      Date of positive COVID19 test: 03/08      COVID19 symptoms: FEVER, BODY ACHES, HEADACHE, DRY COUGH     Date of initial quarantine: 03/08    Additional information or concerns:  WOULD LIKE TO KNOW IF SOMETHING CAN BE CALLED IN TO HELP WITH THE SYMPTOMS     What is the patients preferred pharmacy:   [facility] [id number] [place] [address] [address] AT [facility] [address] - [phone]  - [phone]   P: [phone]

## 2023-03-09 ENCOUNTER — TELEMEDICINE (OUTPATIENT)
Dept: INTERNAL MEDICINE | Facility: CLINIC | Age: 78
End: 2023-03-09
Payer: COMMERCIAL

## 2023-03-09 DIAGNOSIS — U07.1 COVID: Primary | ICD-10-CM

## 2023-03-09 PROCEDURE — 99213 OFFICE O/P EST LOW 20 MIN: CPT | Performed by: NURSE PRACTITIONER

## 2023-03-09 RX ORDER — PSEUDOEPHEDRINE HCL 30 MG
TABLET ORAL
COMMUNITY
Start: 2022-01-01

## 2023-03-09 RX ORDER — BENZONATATE 200 MG/1
200 CAPSULE ORAL 3 TIMES DAILY PRN
Qty: 30 CAPSULE | Refills: 0 | Status: SHIPPED | OUTPATIENT
Start: 2023-03-09

## 2023-03-09 NOTE — PROGRESS NOTES
Subjective   Santy Burns is a 78 y.o. male. Patient is here today for   Chief Complaint   Patient presents with   • Generalized Body Aches   • Fever   • Headache   • Cough   • Sore Throat          .    History of Present Illness   You have chosen to receive care through a telehealth visit.  Do you consent to use a video/audio connection for your medical care today? Yes    Patient is located at home  Provider is located at office at [facility]    C/o body aches associated with fever, headache, cough, sore throat. His symptoms started 4 days ago. Tested positive for Covid 3 days ago.   Cough makes it hard to sleep. 101.4F is the highest his fever has been. He has taken advil and delsym with some relief.    The following portions of the patient's history were reviewed and updated as appropriate: allergies, current medications, past family history, past medical history, past social history, past surgical history and problem list.    Review of Systems    Objective   There were no vitals filed for this visit.  There is no height or weight on file to calculate BMI.  Physical Exam  Nursing note reviewed.   Constitutional:       Appearance: Normal appearance. He is well-developed.   Pulmonary:      Effort: Pulmonary effort is normal.   Neurological:      Mental Status: He is alert and oriented to person, place, and time.   Psychiatric:         Speech: Speech normal.         Behavior: Behavior normal.         Thought Content: Thought content normal.         Assessment & Plan   Diagnoses and all orders for this visit:    1. COVID (Primary)  -     Nirmatrelvir&Ritonavir 300/100 (PAXLOVID) 20 x 150 MG & 10 x 100MG tablet therapy pack tablet; Take 3 tablets by mouth 2 (Two) Times a Day for 5 days.  Dispense: 30 tablet; Refill: 0  -     benzonatate (TESSALON) 200 MG capsule; Take 1 capsule by mouth 3 (Three) Times a Day As Needed for Cough.  Dispense: 30 capsule; Refill: 0    Instructed patient to monitor blood  pressure since paxlovid can increase the effects of amlodipine.  Increase fluids, rest.  Quarantine for 5 days.

## 2023-04-18 ENCOUNTER — OFFICE VISIT (OUTPATIENT)
Dept: INTERNAL MEDICINE | Facility: CLINIC | Age: 78
End: 2023-04-18
Payer: COMMERCIAL

## 2023-04-18 VITALS
TEMPERATURE: 97.6 F | OXYGEN SATURATION: 99 % | WEIGHT: 150 LBS | DIASTOLIC BLOOD PRESSURE: 70 MMHG | SYSTOLIC BLOOD PRESSURE: 136 MMHG | BODY MASS INDEX: 23.54 KG/M2 | HEART RATE: 70 BPM | HEIGHT: 67 IN

## 2023-04-18 DIAGNOSIS — Z00.00 HEALTHCARE MAINTENANCE: Primary | ICD-10-CM

## 2023-04-18 DIAGNOSIS — R73.01 ELEVATED FASTING GLUCOSE: ICD-10-CM

## 2023-04-18 DIAGNOSIS — I10 PRIMARY HYPERTENSION: ICD-10-CM

## 2023-04-18 PROCEDURE — 99397 PER PM REEVAL EST PAT 65+ YR: CPT | Performed by: NURSE PRACTITIONER

## 2023-04-18 NOTE — PROGRESS NOTES
"Subjective   Santy Burns is a 78 y.o. male and is here for a comprehensive physical exam. The patient reports no problems.    Do you take any herbs or supplements that were not prescribed by a doctor? vit B12, calcium    Patient is here to follow up on hypertension which is controlled with current medications. Denies chest pain, headaches.   At home, his readings are 109/59 to 126/61    Cataract surgery this summer - both eyes     History:  Patient receives prostate care outside our clinic    The following portions of the patient's history were reviewed and updated as appropriate: allergies, current medications, past family history, past medical history, past social history, past surgical history and problem list.    Review of Systems  Do you have pain that bothers you in your daily life? no  A comprehensive review of systems was negative.    Objective   /70   Pulse 70   Temp 97.6 °F (36.4 °C)   Ht 170.2 cm (67.01\")   Wt 68 kg (150 lb)   SpO2 99%   BMI 23.49 kg/m²     General Appearance:    Alert, cooperative, no distress, appears stated age   Head:    Normocephalic, without obvious abnormality, atraumatic   Eyes:    PERRL, conjunctiva/corneas clear, EOM's intact, both eyes   Ears:    Normal TM's and external ear canals, both ears   Nose:   Nares normal, septum midline, mucosa normal, no drainage    or sinus tenderness   Throat:   Lips, mucosa, and tongue normal; teeth and gums normal   Neck:   Supple, symmetrical, trachea midline, no adenopathy;     thyroid:  no enlargement/tenderness/nodules; no carotid    bruit   Back:     Symmetric, no curvature, ROM normal, no CVA tenderness   Lungs:     Clear to auscultation bilaterally, respirations unlabored   Chest Wall:    No tenderness or deformity    Heart:    Regular rate and rhythm, S1 and S2 normal, no murmur       Abdomen:     Soft, non-tender, bowel sounds active all four quadrants,     no masses, no organomegaly           Extremities:   " Extremities normal, atraumatic, no cyanosis or edema   Pulses:   2+ and symmetric all extremities   Skin:   Skin color, texture, turgor normal, no rashes; soft palpable nodule on right side of neck (dermatologist has seen)   Lymph nodes:   Cervical, supraclavicular, and axillary nodes normal   Neurologic:   Grossly intact, normal strength, sensation and reflexes     throughout      Reviewed labs done on 4/3/2023 with patient.     Assessment & Plan   Healthy male exam.      1. Diagnoses and all orders for this visit:    1. Healthcare maintenance (Primary)  -     Lipid Panel With / Chol / HDL Ratio; Future    2. Elevated fasting glucose  -     Comprehensive Metabolic Panel; Future  -     Hemoglobin A1c; Future    3. Primary hypertension  -     Comprehensive Metabolic Panel; Future    HTN - continue amlodipine-valsartan  mg daily    2. Patient Counseling:  --Nutrition: Stressed importance of moderation in sodium/caffeine intake, saturated fat and cholesterol, caloric balance, sufficient intake of fresh fruits, vegetables, fiber, calcium, iron.  --Exercise: Stressed the importance of regular exercise.   --Dental health: Discussed importance of regular tooth brushing, flossing, and dental visits.  --Immunizations reviewed.    3. Discussed the patient's BMI with him.    BMI is within normal parameters. No other follow-up for BMI required.       4. Follow up in 6 months for recheck and labs

## 2023-06-15 DIAGNOSIS — I10 ESSENTIAL HYPERTENSION: ICD-10-CM

## 2023-06-15 RX ORDER — AMLODIPINE AND VALSARTAN 10; 320 MG/1; MG/1
TABLET ORAL
Qty: 90 TABLET | Refills: 1 | Status: SHIPPED | OUTPATIENT
Start: 2023-06-15

## 2023-10-05 DIAGNOSIS — R73.01 ELEVATED FASTING GLUCOSE: ICD-10-CM

## 2023-10-05 DIAGNOSIS — I10 PRIMARY HYPERTENSION: ICD-10-CM

## 2023-10-05 DIAGNOSIS — Z00.00 HEALTHCARE MAINTENANCE: ICD-10-CM

## 2023-10-12 LAB
ALBUMIN SERPL-MCNC: 4.5 G/DL (ref 3.5–5.2)
ALBUMIN/GLOB SERPL: 2 G/DL
ALP SERPL-CCNC: 90 U/L (ref 39–117)
ALT SERPL-CCNC: 26 U/L (ref 1–41)
AST SERPL-CCNC: 28 U/L (ref 1–40)
BILIRUB SERPL-MCNC: 0.7 MG/DL (ref 0–1.2)
BUN SERPL-MCNC: 11 MG/DL (ref 8–23)
BUN/CREAT SERPL: 12.8 (ref 7–25)
CALCIUM SERPL-MCNC: 9.8 MG/DL (ref 8.6–10.5)
CHLORIDE SERPL-SCNC: 106 MMOL/L (ref 98–107)
CHOLEST SERPL-MCNC: 196 MG/DL (ref 0–200)
CHOLEST/HDLC SERPL: 2.8 {RATIO}
CO2 SERPL-SCNC: 27.3 MMOL/L (ref 22–29)
CREAT SERPL-MCNC: 0.86 MG/DL (ref 0.76–1.27)
EGFRCR SERPLBLD CKD-EPI 2021: 89.2 ML/MIN/1.73
GLOBULIN SER CALC-MCNC: 2.2 GM/DL
GLUCOSE SERPL-MCNC: 98 MG/DL (ref 65–99)
HBA1C MFR BLD: 5.7 % (ref 4.8–5.6)
HDLC SERPL-MCNC: 70 MG/DL (ref 40–60)
LDLC SERPL CALC-MCNC: 109 MG/DL (ref 0–100)
POTASSIUM SERPL-SCNC: 4.2 MMOL/L (ref 3.5–5.2)
PROT SERPL-MCNC: 6.7 G/DL (ref 6–8.5)
SODIUM SERPL-SCNC: 142 MMOL/L (ref 136–145)
TRIGL SERPL-MCNC: 93 MG/DL (ref 0–150)
VLDLC SERPL CALC-MCNC: 17 MG/DL (ref 5–40)

## 2023-10-19 ENCOUNTER — OFFICE VISIT (OUTPATIENT)
Dept: INTERNAL MEDICINE | Facility: CLINIC | Age: 78
End: 2023-10-19
Payer: COMMERCIAL

## 2023-10-19 VITALS
WEIGHT: 150 LBS | SYSTOLIC BLOOD PRESSURE: 150 MMHG | TEMPERATURE: 97.5 F | OXYGEN SATURATION: 98 % | BODY MASS INDEX: 23.54 KG/M2 | HEART RATE: 74 BPM | HEIGHT: 67 IN | DIASTOLIC BLOOD PRESSURE: 70 MMHG

## 2023-10-19 DIAGNOSIS — R73.03 PREDIABETES: ICD-10-CM

## 2023-10-19 DIAGNOSIS — I49.9 IRREGULAR HEART RATE: ICD-10-CM

## 2023-10-19 DIAGNOSIS — Z00.00 HEALTHCARE MAINTENANCE: Primary | ICD-10-CM

## 2023-10-19 DIAGNOSIS — I10 ESSENTIAL HYPERTENSION: ICD-10-CM

## 2023-10-19 RX ORDER — AMLODIPINE AND VALSARTAN 10; 320 MG/1; MG/1
1 TABLET ORAL DAILY
Qty: 90 TABLET | Refills: 3 | Status: SHIPPED | OUTPATIENT
Start: 2023-10-19

## 2023-10-19 NOTE — PROGRESS NOTES
Subjective   Santy Burns is a 78 y.o. male. Patient is here today for   Chief Complaint   Patient presents with    Follow-up     Patient is here for 6 month follow up with labs done prior.    .    History of Present Illness   Patient is here to follow up on hypertension which is controlled with current medications. Denies chest pain, headaches.   He checks his BP at home regularly. It ranges from 101/58 to 129/58.     Patient is also here to follow up on blood sugar.     The following portions of the patient's history were reviewed and updated as appropriate: allergies, current medications, past family history, past medical history, past social history, past surgical history and problem list.    Review of Systems    Objective     Vitals:    10/19/23 1104   BP: 150/70   Pulse: 74   Temp: 97.5 °F (36.4 °C)   SpO2: 98%     Body mass index is 23.49 kg/m².    Orders Only on 10/05/2023   Component Date Value Ref Range Status    Total Cholesterol 10/12/2023 196  0 - 200 mg/dL Final    Comment: Cholesterol Reference Ranges  (U.S. Department of Health and Human Services ATP III  Classifications)  Desirable          <200 mg/dL  Borderline High    200-239 mg/dL  High Risk          >240 mg/dL  Triglyceride Reference Ranges  (U.S. Department of Health and Human Services ATP III  Classifications)  Normal           <150 mg/dL  Borderline High  150-199 mg/dL  High             200-499 mg/dL  Very High        >500 mg/dL  HDL Reference Ranges  (U.S. Department of Health and Human Services ATP III  Classifications)  Low     <40 mg/dl (major risk factor for CHD)  High    >60 mg/dl ('negative' risk factor for CHD)  LDL Reference Ranges  (U.S. Department of Health and Human Services ATP III  Classifications)  Optimal          <100 mg/dL  Near Optimal     100-129 mg/dL  Borderline High  130-159 mg/dL  High             160-189 mg/dL  Very High        >189 mg/dL      Triglycerides 10/12/2023 93  0 - 150 mg/dL Final    HDL Cholesterol  10/12/2023 70 (H)  40 - 60 mg/dL Final    VLDL Cholesterol Andrzej 10/12/2023 17  5 - 40 mg/dL Final    LDL Chol Calc (NIH) 10/12/2023 109 (H)  0 - 100 mg/dL Final    Chol/HDL Ratio 10/12/2023 2.80   Final    Hemoglobin A1C 10/12/2023 5.70 (H)  4.80 - 5.60 % Final    Comment: Hemoglobin A1C Ranges:  Increased Risk for Diabetes  5.7% to 6.4%  Diabetes                     >= 6.5%  Diabetic Goal                < 7.0%      Glucose 10/12/2023 98  65 - 99 mg/dL Final    BUN 10/12/2023 11  8 - 23 mg/dL Final    Creatinine 10/12/2023 0.86  0.76 - 1.27 mg/dL Final    EGFR Result 10/12/2023 89.2  >60.0 mL/min/1.73 Final    Comment: GFR Normal >60  Chronic Kidney Disease <60  Kidney Failure <15  The GFR formula is only valid for adults with stable renal  function between ages 18 and 70.      BUN/Creatinine Ratio 10/12/2023 12.8  7.0 - 25.0 Final    Sodium 10/12/2023 142  136 - 145 mmol/L Final    Potassium 10/12/2023 4.2  3.5 - 5.2 mmol/L Final    Chloride 10/12/2023 106  98 - 107 mmol/L Final    Total CO2 10/12/2023 27.3  22.0 - 29.0 mmol/L Final    Calcium 10/12/2023 9.8  8.6 - 10.5 mg/dL Final    Total Protein 10/12/2023 6.7  6.0 - 8.5 g/dL Final    Albumin 10/12/2023 4.5  3.5 - 5.2 g/dL Final    Globulin 10/12/2023 2.2  gm/dL Final    A/G Ratio 10/12/2023 2.0  g/dL Final    Total Bilirubin 10/12/2023 0.7  0.0 - 1.2 mg/dL Final    Alkaline Phosphatase 10/12/2023 90  39 - 117 U/L Final    AST (SGOT) 10/12/2023 28  1 - 40 U/L Final    ALT (SGPT) 10/12/2023 26  1 - 41 U/L Final     Reviewed labs with patient.     Physical Exam  Vitals and nursing note reviewed.   Constitutional:       Appearance: Normal appearance. He is well-developed.   Cardiovascular:      Rate and Rhythm: Normal rate and regular rhythm. Extrasystoles are present.     Heart sounds: Normal heart sounds.   Pulmonary:      Effort: Pulmonary effort is normal.      Breath sounds: Normal breath sounds.   Skin:     General: Skin is warm and dry.   Neurological:       Mental Status: He is alert and oriented to person, place, and time.   Psychiatric:         Speech: Speech normal.         Behavior: Behavior normal.         Thought Content: Thought content normal.         ECG 12 Lead    Date/Time: 10/19/2023 11:33 AM  Performed by: Jordana Goss APRN    Authorized by: Jordana Goss APRN  Comparison: compared with previous ECG from 11/29/2022  Similar to previous ECG  Rhythm: sinus rhythm  Rate: normal  BPM: 72  Conduction: conduction normal  ST Segments: ST segments normal  T Waves: T waves normal  QRS axis: normal    Clinical impression: normal ECG  Comments: WI interval 208 ms  QRS interval 89 ms  QTc interval 395 ms              Assessment & Plan   Diagnoses and all orders for this visit:    1. Healthcare maintenance (Primary)  -     Fluzone High-Dose 65+yrs ([id number])  -     Lipid Panel With / Chol / HDL Ratio; Future    2. Essential hypertension  -     amLODIPine-valsartan (EXFORGE)  MG per tablet; Take 1 tablet by mouth Daily.  Dispense: 90 tablet; Refill: 3  -     Comprehensive Metabolic Panel; Future    3. Irregular heart rate  -     ECG 12 Lead    4. Prediabetes  -     Comprehensive Metabolic Panel; Future  -     Hemoglobin A1c; Future    HTN - continue amlodipine-valsartan  mg daily    Prediabetes - discussed dietary changes.     Irregular heart rate - these were likely rare PVCs. None seen on EKG    F/u in 6 months for physical and fasting labs             Current Outpatient Medications:     amLODIPine-valsartan (EXFORGE)  MG per tablet, Take 1 tablet by mouth Daily., Disp: 90 tablet, Rfl: 3    Calcium Citrate 250 MG tablet, , Disp: , Rfl:     Fexofenadine HCl (ALLEGRA PO), Take 1 tablet by mouth Daily., Disp: , Rfl:     fluticasone (FLONASE) 50 MCG/ACT nasal spray, 1 spray into the nostril(s) as directed by provider As Needed for Rhinitis., Disp: , Rfl:     Triamcinolone Acetonide 0.05 % ointment, Apply 1 application topically As Needed., Disp: ,  Rfl:     vitamin B-12 (CYANOCOBALAMIN) 1000 MCG tablet, Take 1 tablet by mouth Daily., Disp: , Rfl:   Answers submitted by the patient for this visit:  Primary Reason for Visit (Submitted on 10/19/2023)  What is the primary reason for your visit?: High Blood Pressure

## 2024-04-10 ENCOUNTER — OFFICE VISIT (OUTPATIENT)
Dept: INTERNAL MEDICINE | Facility: CLINIC | Age: 79
End: 2024-04-10
Payer: COMMERCIAL

## 2024-04-10 VITALS
HEART RATE: 88 BPM | DIASTOLIC BLOOD PRESSURE: 64 MMHG | TEMPERATURE: 97.9 F | HEIGHT: 67 IN | BODY MASS INDEX: 22.81 KG/M2 | OXYGEN SATURATION: 98 % | WEIGHT: 145.3 LBS | SYSTOLIC BLOOD PRESSURE: 150 MMHG

## 2024-04-10 DIAGNOSIS — M77.8 LEFT SHOULDER TENDONITIS: Primary | ICD-10-CM

## 2024-04-10 PROCEDURE — 99213 OFFICE O/P EST LOW 20 MIN: CPT | Performed by: INTERNAL MEDICINE

## 2024-04-10 RX ORDER — MELATONIN
1000 DAILY
COMMUNITY

## 2024-04-10 NOTE — PROGRESS NOTES
Subjective   Santy Burns is a 79 y.o. male.   Left shoulder pain [id number]  getting worse and daily    History of Present Illness   Pain is worse at night and with certain movement    He deneis any trauma  does get some relief with ibuprofen patient says it does wake him up frequently at night.  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  He has been to physical therapy in the past for other issues and usually gets good relief    The following portions of the patient's history were reviewed and updated as appropriate: allergies, current medications, past family history, past medical history, past social history, past surgical history, and problem list.    Review of Systems    Objective   Physical Exam  Vitals reviewed.   Constitutional:       Appearance: He is well-developed.   Neck:      Thyroid: No thyromegaly.      Trachea: No tracheal deviation.   Cardiovascular:      Rate and Rhythm: Normal rate and regular rhythm.      Heart sounds: Normal heart sounds.   Abdominal:      General: There is no distension.      Tenderness: There is no abdominal tenderness.   Musculoskeletal:         General: No deformity. Normal range of motion.      Cervical back: Normal range of motion.      Comments: Pain with palpation anteriorly on the left shoulder down to the insertion of the deltoid tendon.  Positive impingement signs on the left shoulder with the empty can   Skin:     General: Skin is warm and dry.   Neurological:      Mental Status: He is alert and oriented to person, place, and time.   Psychiatric:         Behavior: Behavior normal.         Thought Content: Thought content normal.         Judgment: Judgment normal.         Vitals:    04/10/24 0923   BP: 150/64   Pulse: 88   Temp: 97.9 °F (36.6 °C)   SpO2: 98%     Body mass index is 22.75 kg/m².         Assessment & Plan   Diagnoses  and all orders for this visit:    1. Left shoulder tendonitis (Primary)        1.  Left shoulder pain I suspect rotator cuff tendinitis.  He does not have any trauma to suggest a tear.  I have given him some exercises to try at home and he would like to see physical therapy.  If he is not progressing with that we will refer him to sports medicine for an injection

## 2024-04-24 ENCOUNTER — OFFICE VISIT (OUTPATIENT)
Dept: INTERNAL MEDICINE | Facility: CLINIC | Age: 79
End: 2024-04-24
Payer: COMMERCIAL

## 2024-04-24 VITALS
HEART RATE: 84 BPM | DIASTOLIC BLOOD PRESSURE: 60 MMHG | HEIGHT: 67 IN | OXYGEN SATURATION: 98 % | TEMPERATURE: 97.3 F | WEIGHT: 142.9 LBS | SYSTOLIC BLOOD PRESSURE: 126 MMHG | BODY MASS INDEX: 22.43 KG/M2

## 2024-04-24 DIAGNOSIS — M75.82 ROTATOR CUFF TENDONITIS, LEFT: Primary | ICD-10-CM

## 2024-04-24 PROCEDURE — 99213 OFFICE O/P EST LOW 20 MIN: CPT | Performed by: INTERNAL MEDICINE

## 2024-04-24 NOTE — PROGRESS NOTES
Subjective   Santy Burns is a 79 y.o. male.   Cont bilat shoulder pain    History of Present Illness   He has been doing PT for a few weeks and some better    The following portions of the patient's history were reviewed and updated as appropriate: allergies, current medications, past family history, past medical history, past social history, past surgical history, and problem list.    Review of Systems    Objective   Physical Exam  Vitals reviewed.   Constitutional:       Appearance: He is well-developed.   HENT:      Head: Normocephalic and atraumatic.      Right Ear: External ear normal.      Left Ear: External ear normal.   Eyes:      Conjunctiva/sclera: Conjunctivae normal.      Pupils: Pupils are equal, round, and reactive to light.   Neck:      Thyroid: No thyromegaly.      Trachea: No tracheal deviation.   Cardiovascular:      Rate and Rhythm: Normal rate and regular rhythm.      Heart sounds: Normal heart sounds.   Pulmonary:      Effort: Pulmonary effort is normal.      Breath sounds: Normal breath sounds.   Abdominal:      General: Bowel sounds are normal. There is no distension.      Palpations: Abdomen is soft.      Tenderness: There is no abdominal tenderness.   Musculoskeletal:         General: No deformity. Normal range of motion.      Cervical back: Normal range of motion.      Comments: Markedly decreased external rotation and abduction of the left shoulder   Skin:     General: Skin is warm and dry.   Neurological:      Mental Status: He is alert and oriented to person, place, and time.   Psychiatric:         Behavior: Behavior normal.         Thought Content: Thought content normal.         Judgment: Judgment normal.         Vitals:    04/24/24 0955   BP: 126/60   Pulse: 84   Temp: 97.3 °F (36.3 °C)   SpO2: 98%     Body mass index is 22.37 kg/m².         Assessment & Plan   Diagnoses and all orders for this visit:    1. Rotator cuff tendonitis, left (Primary)  -     Ambulatory Referral to  Sports Medicine      Rotator cuff tendinitis markedly worse on the left with decreased range of motion.  He is having a lot of night pain.  Physical therapy is definitely helping but he is still having a significant amount of discomfort.  We will go ahead and have him see Ortho perhaps an injection would help him and may be even facilitate the physical therapy.

## 2024-05-02 ENCOUNTER — OFFICE VISIT (OUTPATIENT)
Dept: SPORTS MEDICINE | Facility: CLINIC | Age: 79
End: 2024-05-02
Payer: COMMERCIAL

## 2024-05-02 VITALS
SYSTOLIC BLOOD PRESSURE: 148 MMHG | TEMPERATURE: 97.3 F | BODY MASS INDEX: 22.29 KG/M2 | OXYGEN SATURATION: 97 % | DIASTOLIC BLOOD PRESSURE: 68 MMHG | WEIGHT: 142 LBS | HEART RATE: 81 BPM | HEIGHT: 67 IN

## 2024-05-02 DIAGNOSIS — M25.511 CHRONIC PAIN OF BOTH SHOULDERS: Primary | ICD-10-CM

## 2024-05-02 DIAGNOSIS — G89.29 CHRONIC PAIN OF BOTH SHOULDERS: Primary | ICD-10-CM

## 2024-05-02 DIAGNOSIS — M25.819 SHOULDER IMPINGEMENT: ICD-10-CM

## 2024-05-02 DIAGNOSIS — M25.512 CHRONIC PAIN OF BOTH SHOULDERS: Primary | ICD-10-CM

## 2024-05-02 PROCEDURE — 99214 OFFICE O/P EST MOD 30 MIN: CPT | Performed by: FAMILY MEDICINE

## 2024-05-02 RX ORDER — MELOXICAM 15 MG/1
15 TABLET ORAL DAILY
Qty: 30 TABLET | Refills: 2 | Status: SHIPPED | OUTPATIENT
Start: 2024-05-02

## 2024-05-03 ENCOUNTER — PATIENT ROUNDING (BHMG ONLY) (OUTPATIENT)
Dept: SPORTS MEDICINE | Facility: CLINIC | Age: 79
End: 2024-05-03
Payer: COMMERCIAL

## 2024-05-03 NOTE — PROGRESS NOTES
May 3, 2024    A [employer] Message has been sent to the patient [place] [facility] [place]

## 2024-05-09 ENCOUNTER — OFFICE VISIT (OUTPATIENT)
Dept: SPORTS MEDICINE | Facility: CLINIC | Age: 79
End: 2024-05-09
Payer: COMMERCIAL

## 2024-05-09 VITALS
BODY MASS INDEX: 22.29 KG/M2 | HEIGHT: 67 IN | OXYGEN SATURATION: 96 % | WEIGHT: 142 LBS | HEART RATE: 80 BPM | TEMPERATURE: 97.2 F | SYSTOLIC BLOOD PRESSURE: 124 MMHG | DIASTOLIC BLOOD PRESSURE: 62 MMHG

## 2024-05-09 DIAGNOSIS — M25.819 SHOULDER IMPINGEMENT: Primary | ICD-10-CM

## 2024-05-09 PROCEDURE — 20610 DRAIN/INJ JOINT/BURSA W/O US: CPT | Performed by: FAMILY MEDICINE

## 2024-05-10 ENCOUNTER — OFFICE VISIT (OUTPATIENT)
Dept: INTERNAL MEDICINE | Facility: CLINIC | Age: 79
End: 2024-05-10
Payer: COMMERCIAL

## 2024-05-10 VITALS
DIASTOLIC BLOOD PRESSURE: 62 MMHG | SYSTOLIC BLOOD PRESSURE: 130 MMHG | HEIGHT: 67 IN | BODY MASS INDEX: 21.82 KG/M2 | OXYGEN SATURATION: 98 % | WEIGHT: 139 LBS | HEART RATE: 74 BPM | TEMPERATURE: 98 F

## 2024-05-10 DIAGNOSIS — Z00.00 HEALTHCARE MAINTENANCE: Primary | ICD-10-CM

## 2024-05-10 DIAGNOSIS — I10 ESSENTIAL HYPERTENSION: ICD-10-CM

## 2024-05-10 PROCEDURE — 99397 PER PM REEVAL EST PAT 65+ YR: CPT | Performed by: NURSE PRACTITIONER

## 2024-05-10 RX ORDER — AMLODIPINE AND VALSARTAN 10; 320 MG/1; MG/1
1 TABLET ORAL DAILY
Qty: 90 TABLET | Refills: 3 | Status: SHIPPED | OUTPATIENT
Start: 2024-05-10

## 2024-06-19 ENCOUNTER — OFFICE VISIT (OUTPATIENT)
Dept: SPORTS MEDICINE | Facility: CLINIC | Age: 79
End: 2024-06-19
Payer: MEDICARE

## 2024-06-19 VITALS
HEART RATE: 88 BPM | OXYGEN SATURATION: 98 % | WEIGHT: 139 LBS | SYSTOLIC BLOOD PRESSURE: 148 MMHG | HEIGHT: 67 IN | BODY MASS INDEX: 21.82 KG/M2 | TEMPERATURE: 97.2 F | DIASTOLIC BLOOD PRESSURE: 68 MMHG

## 2024-06-19 DIAGNOSIS — M25.819 SHOULDER IMPINGEMENT: Primary | ICD-10-CM

## 2024-06-19 PROCEDURE — 3077F SYST BP >= 140 MM HG: CPT | Performed by: FAMILY MEDICINE

## 2024-06-19 PROCEDURE — 99213 OFFICE O/P EST LOW 20 MIN: CPT | Performed by: FAMILY MEDICINE

## 2024-06-19 PROCEDURE — 3078F DIAST BP <80 MM HG: CPT | Performed by: FAMILY MEDICINE

## 2024-06-19 NOTE — PROGRESS NOTES
"Santy is a 79 y.o. year old male     Chief Complaint   Patient presents with    Shoulder Pain     BILATERAL SHOULDER- Patient is here to follow up on bilateral shoulder pain, patient states they are doing better.        History of Present Illness  History of Present Illness  The patient is here today to follow up on bilateral shoulder pain.    Since his last visit, the patient has reported a steady improvement in his pain, reducing to only a 1 or 2 out of 10 in severity. The pain is intermittent, notably exacerbated upon rising from bed in the morning and during activities such as donning a shirt. He perceives a positive progress in physical therapy.    I have reviewed the patient's medical, family, and social history in detail and updated the computerized patient record.    Review of Systems    /68 (BP Location: Right arm, Patient Position: Sitting, Cuff Size: Adult)   Pulse 88   Temp 97.2 °F (36.2 °C) (Temporal)   Ht 170.2 cm (67.01\")   Wt 63 kg (139 lb)   SpO2 98%   BMI 21.76 kg/m²      Physical Exam    Vital signs reviewed.   General: No acute distress.      Physical Exam  Bilateral shoulders exhibit a normal range of motion except for external rotation only about 60 degrees bilaterally. Internal rotation is to the SI joint bilaterally. Normal rotator cuff strength. There is mild pain with empty can maneuver bilaterally. He has a mildly positive Neer and Funes maneuver bilaterally. All together, his exam looks much improved compared to his last visit. He has less pain with provocative motions.    Results  Results      Procedures     Diagnoses and all orders for this visit:    Shoulder impingement  -     Ambulatory Referral to Physical Therapy for Evaluation & Treatment      Assessment & Plan    The current treatment plan will be maintained, along with the continuation of physical therapy.    Follow-up  A follow-up appointment is scheduled for approximately 6 weeks from now to evaluate the " patient's progress. If the patient's condition is resolved, it can be pushed back or cancelled.    Patient or patient representative verbalized consent for the use of Ambient Listening during the visit with  Jay Feldman MD for chart documentation. 6/19/2024  17:12 EDT  *Dictated after leaving exam room.   Jay Feldman MD   14:04 EDT   06/19/24

## 2024-07-25 ENCOUNTER — OFFICE VISIT (OUTPATIENT)
Dept: SPORTS MEDICINE | Facility: CLINIC | Age: 79
End: 2024-07-25
Payer: MEDICARE

## 2024-07-25 VITALS
SYSTOLIC BLOOD PRESSURE: 138 MMHG | HEART RATE: 74 BPM | BODY MASS INDEX: 21.82 KG/M2 | TEMPERATURE: 97.6 F | DIASTOLIC BLOOD PRESSURE: 68 MMHG | WEIGHT: 139 LBS | HEIGHT: 67 IN | OXYGEN SATURATION: 97 %

## 2024-07-25 DIAGNOSIS — M51.36 DDD (DEGENERATIVE DISC DISEASE), LUMBAR: ICD-10-CM

## 2024-07-25 DIAGNOSIS — M19.011 PRIMARY OSTEOARTHRITIS OF BOTH SHOULDERS: ICD-10-CM

## 2024-07-25 DIAGNOSIS — S39.012A STRAIN OF LUMBAR REGION, INITIAL ENCOUNTER: ICD-10-CM

## 2024-07-25 DIAGNOSIS — M19.012 PRIMARY OSTEOARTHRITIS OF BOTH SHOULDERS: ICD-10-CM

## 2024-07-25 DIAGNOSIS — M25.511 CHRONIC PAIN OF BOTH SHOULDERS: ICD-10-CM

## 2024-07-25 DIAGNOSIS — G89.29 CHRONIC PAIN OF BOTH SHOULDERS: ICD-10-CM

## 2024-07-25 DIAGNOSIS — M25.512 CHRONIC PAIN OF BOTH SHOULDERS: ICD-10-CM

## 2024-07-25 DIAGNOSIS — M25.819 SHOULDER IMPINGEMENT: Primary | ICD-10-CM

## 2024-07-25 PROCEDURE — 99214 OFFICE O/P EST MOD 30 MIN: CPT | Performed by: FAMILY MEDICINE

## 2024-07-25 PROCEDURE — 3078F DIAST BP <80 MM HG: CPT | Performed by: FAMILY MEDICINE

## 2024-07-25 PROCEDURE — 3075F SYST BP GE 130 - 139MM HG: CPT | Performed by: FAMILY MEDICINE

## 2024-07-25 RX ORDER — TIZANIDINE 2 MG/1
2 TABLET ORAL EVERY 8 HOURS PRN
Qty: 30 TABLET | Refills: 2 | Status: SHIPPED | OUTPATIENT
Start: 2024-07-25

## 2024-07-25 RX ORDER — MELOXICAM 7.5 MG/1
15 TABLET ORAL DAILY
Qty: 60 TABLET | Refills: 2 | Status: SHIPPED | OUTPATIENT
Start: 2024-07-25

## 2024-07-25 NOTE — PROGRESS NOTES
"Santy is a 79 y.o. year old male     Chief Complaint   Patient presents with    Shoulder Pain     BILATERAL SHOULDER- Patient states shoulders are better.     Back Pain     LUMBAR SPINE- Patient has been having muscle spasms for the past couple of weeks.        History of Present Illness  History of Present Illness  The patient presents for follow-up of bilateral shoulder pain and a new complaint of low back pain.    He reports that his shoulder condition is gradually improving, and he has regained his range of motion tolerance. However, he still experiences mild aching, but otherwise, his condition is stable.    He has a past medical history of lumbar arthritis and reports experiencing muscular pain, more severe on the right side than the left, that worsens with unpredictable movements.    I have reviewed the patient's medical, family, and social history in detail and updated the computerized patient record.    Review of Systems    /68 (BP Location: Left arm, Patient Position: Sitting, Cuff Size: Small Adult)   Pulse 74   Temp 97.6 °F (36.4 °C) (Temporal)   Ht 170.2 cm (67.01\")   Wt 63 kg (139 lb)   SpO2 97%   BMI 21.76 kg/m²      Physical Exam    Vital signs reviewed.   General: No acute distress.      Physical Exam  Musculoskeletal examination reveals bilateral shoulders with normal range of motion, except for pain at about 60 degrees of external rotation. Rotator cuff strength is normal. Negative Funse today.   Lumbar spine has a normal range of motion with no midline tenderness. Pain is present, right worse than left, in the paraspinals with some palpable spasm on the right side.    Results  Results      Procedures     Diagnoses and all orders for this visit:    Shoulder impingement  -     meloxicam (Mobic) 7.5 MG tablet; Take 2 tablets by mouth Daily.    Primary osteoarthritis of both shoulders    DDD (degenerative disc disease), lumbar    Strain of lumbar region, initial encounter  -     " tiZANidine (ZANAFLEX) 2 MG tablet; Take 1 tablet by mouth Every 8 (Eight) Hours As Needed for Muscle Spasms.    Chronic pain of both shoulders  -     meloxicam (Mobic) 7.5 MG tablet; Take 2 tablets by mouth Daily.      Assessment & Plan  1. Bilateral shoulder pain.  Continued progression with his shoulders. We are going to try to taper down his meloxicam to a lower dose. He can take a total of 15 mg a day, but we will try to start moving towards just 7.5 mg daily. If necessary, we can plan a glenohumeral injection for the arthritis element there. He had partial relief with subacromial injection previously.    2. Low back pain.  This seems to be a muscular stress related to his underlying lumbar arthritis. I will add a low-dose tizanidine to try to help him settle that down.    Follow-up  The patient will follow up in about 2 months. He is okay to adjust that if needed.    Patient or patient representative verbalized consent for the use of Ambient Listening during the visit with  Jay Feldman MD for chart documentation. 7/31/2024  09:25 EDT    Jay Feldman MD   14:11 EDT   07/25/24

## 2024-08-04 DIAGNOSIS — M25.511 CHRONIC PAIN OF BOTH SHOULDERS: ICD-10-CM

## 2024-08-04 DIAGNOSIS — M25.512 CHRONIC PAIN OF BOTH SHOULDERS: ICD-10-CM

## 2024-08-04 DIAGNOSIS — M25.819 SHOULDER IMPINGEMENT: ICD-10-CM

## 2024-08-04 DIAGNOSIS — G89.29 CHRONIC PAIN OF BOTH SHOULDERS: ICD-10-CM

## 2024-08-06 RX ORDER — MELOXICAM 15 MG/1
15 TABLET ORAL DAILY
Qty: 30 TABLET | Refills: 2 | OUTPATIENT
Start: 2024-08-06

## 2024-08-09 ENCOUNTER — TELEPHONE (OUTPATIENT)
Dept: SPORTS MEDICINE | Facility: CLINIC | Age: 79
End: 2024-08-09
Payer: COMMERCIAL

## 2024-08-09 DIAGNOSIS — M51.36 DDD (DEGENERATIVE DISC DISEASE), LUMBAR: ICD-10-CM

## 2024-08-09 DIAGNOSIS — M25.511 CHRONIC PAIN OF BOTH SHOULDERS: Primary | ICD-10-CM

## 2024-08-09 DIAGNOSIS — G89.29 CHRONIC PAIN OF BOTH SHOULDERS: Primary | ICD-10-CM

## 2024-08-09 DIAGNOSIS — M25.512 CHRONIC PAIN OF BOTH SHOULDERS: Primary | ICD-10-CM

## 2024-08-09 NOTE — TELEPHONE ENCOUNTER
Patient would like a new referral for PT sent over to [facility] Fax # [phone].    Patient would like a call when it has been done @ [phone].    Ja

## 2024-08-15 NOTE — TELEPHONE ENCOUNTER
Updated the referral and faxed to patient requested location.   No further action needed at this time.  -Hui

## 2024-08-23 DIAGNOSIS — S39.012A STRAIN OF LUMBAR REGION, INITIAL ENCOUNTER: ICD-10-CM

## 2024-08-23 RX ORDER — TIZANIDINE 2 MG/1
2 TABLET ORAL EVERY 8 HOURS PRN
Qty: 30 TABLET | Refills: 2 | Status: SHIPPED | OUTPATIENT
Start: 2024-08-23

## 2024-09-23 ENCOUNTER — OFFICE VISIT (OUTPATIENT)
Dept: SPORTS MEDICINE | Facility: CLINIC | Age: 79
End: 2024-09-23
Payer: MEDICARE

## 2024-09-23 VITALS
HEART RATE: 81 BPM | TEMPERATURE: 97 F | SYSTOLIC BLOOD PRESSURE: 140 MMHG | WEIGHT: 139 LBS | DIASTOLIC BLOOD PRESSURE: 60 MMHG | HEIGHT: 67 IN | BODY MASS INDEX: 21.82 KG/M2 | OXYGEN SATURATION: 98 %

## 2024-09-23 DIAGNOSIS — M25.562 CHRONIC PAIN OF LEFT KNEE: Primary | ICD-10-CM

## 2024-09-23 DIAGNOSIS — M17.12 PRIMARY OSTEOARTHRITIS OF LEFT KNEE: ICD-10-CM

## 2024-09-23 DIAGNOSIS — G89.29 CHRONIC PAIN OF LEFT KNEE: Primary | ICD-10-CM

## 2024-09-23 PROCEDURE — 99213 OFFICE O/P EST LOW 20 MIN: CPT | Performed by: FAMILY MEDICINE

## 2024-09-23 PROCEDURE — 3077F SYST BP >= 140 MM HG: CPT | Performed by: FAMILY MEDICINE

## 2024-09-23 PROCEDURE — 3078F DIAST BP <80 MM HG: CPT | Performed by: FAMILY MEDICINE

## 2024-11-14 DIAGNOSIS — I10 ESSENTIAL HYPERTENSION: Primary | ICD-10-CM

## 2024-11-15 LAB
ALBUMIN SERPL-MCNC: 4.2 G/DL (ref 3.5–5.2)
ALBUMIN/GLOB SERPL: 1.9 G/DL
ALP SERPL-CCNC: 96 U/L (ref 39–117)
ALT SERPL-CCNC: 19 U/L (ref 1–41)
AST SERPL-CCNC: 25 U/L (ref 1–40)
BASOPHILS # BLD AUTO: 0.04 10*3/MM3 (ref 0–0.2)
BASOPHILS NFR BLD AUTO: 0.7 % (ref 0–1.5)
BILIRUB SERPL-MCNC: 0.5 MG/DL (ref 0–1.2)
BUN SERPL-MCNC: 15 MG/DL (ref 8–23)
BUN/CREAT SERPL: 17.4 (ref 7–25)
CALCIUM SERPL-MCNC: 9.2 MG/DL (ref 8.6–10.5)
CHLORIDE SERPL-SCNC: 107 MMOL/L (ref 98–107)
CO2 SERPL-SCNC: 27.2 MMOL/L (ref 22–29)
CREAT SERPL-MCNC: 0.86 MG/DL (ref 0.76–1.27)
EGFRCR SERPLBLD CKD-EPI 2021: 88.6 ML/MIN/1.73
EOSINOPHIL # BLD AUTO: 0.16 10*3/MM3 (ref 0–0.4)
EOSINOPHIL NFR BLD AUTO: 2.7 % (ref 0.3–6.2)
ERYTHROCYTE [DISTWIDTH] IN BLOOD BY AUTOMATED COUNT: 12.6 % (ref 12.3–15.4)
GLOBULIN SER CALC-MCNC: 2.2 GM/DL
GLUCOSE SERPL-MCNC: 98 MG/DL (ref 65–99)
HCT VFR BLD AUTO: 42.4 % (ref 37.5–51)
HGB BLD-MCNC: 13.9 G/DL (ref 13–17.7)
IMM GRANULOCYTES # BLD AUTO: 0.02 10*3/MM3 (ref 0–0.05)
IMM GRANULOCYTES NFR BLD AUTO: 0.3 % (ref 0–0.5)
LYMPHOCYTES # BLD AUTO: 1.33 10*3/MM3 (ref 0.7–3.1)
LYMPHOCYTES NFR BLD AUTO: 22.8 % (ref 19.6–45.3)
MCH RBC QN AUTO: 29.4 PG (ref 26.6–33)
MCHC RBC AUTO-ENTMCNC: 32.8 G/DL (ref 31.5–35.7)
MCV RBC AUTO: 89.6 FL (ref 79–97)
MONOCYTES # BLD AUTO: 0.54 10*3/MM3 (ref 0.1–0.9)
MONOCYTES NFR BLD AUTO: 9.2 % (ref 5–12)
NEUTROPHILS # BLD AUTO: 3.75 10*3/MM3 (ref 1.7–7)
NEUTROPHILS NFR BLD AUTO: 64.3 % (ref 42.7–76)
NRBC BLD AUTO-RTO: 0 /100 WBC (ref 0–0.2)
PLATELET # BLD AUTO: 214 10*3/MM3 (ref 140–450)
POTASSIUM SERPL-SCNC: 4.4 MMOL/L (ref 3.5–5.2)
PROT SERPL-MCNC: 6.4 G/DL (ref 6–8.5)
RBC # BLD AUTO: 4.73 10*6/MM3 (ref 4.14–5.8)
SODIUM SERPL-SCNC: 142 MMOL/L (ref 136–145)
WBC # BLD AUTO: 5.84 10*3/MM3 (ref 3.4–10.8)

## 2024-11-18 LAB
HBA1C MFR BLD: 5.5 % (ref 4.8–5.6)
WRITTEN AUTHORIZATION: NORMAL

## 2024-11-21 ENCOUNTER — OFFICE VISIT (OUTPATIENT)
Dept: INTERNAL MEDICINE | Facility: CLINIC | Age: 79
End: 2024-11-21
Payer: MEDICARE

## 2024-11-21 VITALS
DIASTOLIC BLOOD PRESSURE: 66 MMHG | HEART RATE: 75 BPM | BODY MASS INDEX: 21.94 KG/M2 | OXYGEN SATURATION: 99 % | HEIGHT: 67 IN | WEIGHT: 139.8 LBS | TEMPERATURE: 97.6 F | SYSTOLIC BLOOD PRESSURE: 136 MMHG

## 2024-11-21 DIAGNOSIS — I10 PRIMARY HYPERTENSION: Primary | ICD-10-CM

## 2024-11-21 PROCEDURE — 1159F MED LIST DOCD IN RCRD: CPT | Performed by: NURSE PRACTITIONER

## 2024-11-21 PROCEDURE — 3078F DIAST BP <80 MM HG: CPT | Performed by: NURSE PRACTITIONER

## 2024-11-21 PROCEDURE — 1125F AMNT PAIN NOTED PAIN PRSNT: CPT | Performed by: NURSE PRACTITIONER

## 2024-11-21 PROCEDURE — 99214 OFFICE O/P EST MOD 30 MIN: CPT | Performed by: NURSE PRACTITIONER

## 2024-11-21 PROCEDURE — 1160F RVW MEDS BY RX/DR IN RCRD: CPT | Performed by: NURSE PRACTITIONER

## 2024-11-21 PROCEDURE — 3075F SYST BP GE 130 - 139MM HG: CPT | Performed by: NURSE PRACTITIONER

## 2024-11-21 NOTE — PROGRESS NOTES
Subjective   Santy Burns is a 79 y.o. male. Patient is here today for   Chief Complaint   Patient presents with    Hypertension   .    History of Present Illness    Patient is here to follow up on hypertension which is controlled with current medications. Denies chest pain, headaches. Patient's readings at home have been: 120/66  110/59   98/57    111/61    Patient has seen Ortho and sports medicine for his shoulder pain.  He has been to physical therapy.  His pain is much better.  He does take ibuprofen as needed for shoulder pain.     The following portions of the patient's history were reviewed and updated as appropriate: allergies, current medications, past family history, past medical history, past social history, past surgical history and problem list.    Review of Systems    Objective     Vitals:    11/21/24 1053   BP: 136/66   Pulse: 75   Temp: 97.6 °F (36.4 °C)   SpO2: 99%     Body mass index is 21.89 kg/m².    Orders Only on 11/14/2024   Component Date Value Ref Range Status    WBC 11/14/2024 5.84  3.40 - 10.80 10*3/mm3 Final    RBC 11/14/2024 4.73  4.14 - 5.80 10*6/mm3 Final    Hemoglobin 11/14/2024 13.9  13.0 - 17.7 g/dL Final    Hematocrit 11/14/2024 42.4  37.5 - 51.0 % Final    MCV 11/14/2024 89.6  79.0 - 97.0 fL Final    MCH 11/14/2024 29.4  26.6 - 33.0 pg Final    MCHC 11/14/2024 32.8  31.5 - 35.7 g/dL Final    RDW 11/14/2024 12.6  12.3 - 15.4 % Final    Platelets 11/14/2024 214  140 - 450 10*3/mm3 Final    Neutrophil Rel % 11/14/2024 64.3  42.7 - 76.0 % Final    Lymphocyte Rel % 11/14/2024 22.8  19.6 - 45.3 % Final    Monocyte Rel % 11/14/2024 9.2  5.0 - 12.0 % Final    Eosinophil Rel % 11/14/2024 2.7  0.3 - 6.2 % Final    Basophil Rel % 11/14/2024 0.7  0.0 - 1.5 % Final    Neutrophils Absolute 11/14/2024 3.75  1.70 - 7.00 10*3/mm3 Final    Lymphocytes Absolute 11/14/2024 1.33  0.70 - 3.10 10*3/mm3 Final    Monocytes Absolute 11/14/2024 0.54  0.10 - 0.90 10*3/mm3 Final    Eosinophils Absolute  11/14/2024 0.16  0.00 - 0.40 10*3/mm3 Final    Basophils Absolute 11/14/2024 0.04  0.00 - 0.20 10*3/mm3 Final    Immature Granulocyte Rel % 11/14/2024 0.3  0.0 - 0.5 % Final    Immature Grans Absolute 11/14/2024 0.02  0.00 - 0.05 10*3/mm3 Final    nRBC 11/14/2024 0.0  0.0 - 0.2 /100 WBC Final    Glucose 11/14/2024 98  65 - 99 mg/dL Final    BUN 11/14/2024 15  8 - 23 mg/dL Final    Creatinine 11/14/2024 0.86  0.76 - 1.27 mg/dL Final    EGFR Result 11/14/2024 88.6  >60.0 mL/min/1.73 Final    Comment: GFR Normal >60  Chronic Kidney Disease <60  Kidney Failure <15  The GFR formula is only valid for adults with stable renal  function between ages 18 and 70.      BUN/Creatinine Ratio 11/14/2024 17.4  7.0 - 25.0 Final    Sodium 11/14/2024 142  136 - 145 mmol/L Final    Potassium 11/14/2024 4.4  3.5 - 5.2 mmol/L Final    Chloride 11/14/2024 107  98 - 107 mmol/L Final    Total CO2 11/14/2024 27.2  22.0 - 29.0 mmol/L Final    Calcium 11/14/2024 9.2  8.6 - 10.5 mg/dL Final    Total Protein 11/14/2024 6.4  6.0 - 8.5 g/dL Final    Albumin 11/14/2024 4.2  3.5 - 5.2 g/dL Final    Globulin 11/14/2024 2.2  gm/dL Final    A/G Ratio 11/14/2024 1.9  g/dL Final    Total Bilirubin 11/14/2024 0.5  0.0 - 1.2 mg/dL Final    Alkaline Phosphatase 11/14/2024 96  39 - 117 U/L Final    AST (SGOT) 11/14/2024 25  1 - 40 U/L Final    ALT (SGPT) 11/14/2024 19  1 - 41 U/L Final    Hemoglobin A1C 11/14/2024 5.50  4.80 - 5.60 % Final    Comment: Hemoglobin A1C Ranges:  Increased Risk for Diabetes  5.7% to 6.4%  Diabetes                     >= 6.5%  Diabetic Goal                < 7.0%      Written Authorization 11/14/2024 Comment   Final    Comment: Written Authorization Received.  Authorization received from WRITTEN REQUEST 11-  Logged by Kim Delatorre       Reviewed labs with patient.     Physical Exam  Vitals and nursing note reviewed.   Constitutional:       Appearance: Normal appearance. He is well-developed.   Cardiovascular:      Rate and  Rhythm: Normal rate and regular rhythm.      Heart sounds: Normal heart sounds.   Pulmonary:      Effort: Pulmonary effort is normal.      Breath sounds: Normal breath sounds.   Skin:     General: Skin is warm and dry.   Neurological:      Mental Status: He is alert and oriented to person, place, and time.   Psychiatric:         Speech: Speech normal.         Behavior: Behavior normal.         Thought Content: Thought content normal.         Assessment & Plan   Diagnoses and all orders for this visit:    1. Primary hypertension (Primary)      HTN - Continue amlodipine-Valsartan  mg daily.            Current Outpatient Medications:     amLODIPine-valsartan (EXFORGE)  MG per tablet, Take 1 tablet by mouth Daily., Disp: 90 tablet, Rfl: 3    Calcium Citrate 250 MG tablet, , Disp: , Rfl:     Cholecalciferol 25 MCG (1000 UT) tablet, Take 1 tablet by mouth Daily., Disp: , Rfl:     Fexofenadine HCl (ALLEGRA PO), Take 1 tablet by mouth Daily., Disp: , Rfl:     fluticasone (FLONASE) 50 MCG/ACT nasal spray, Administer 1 spray into the nostril(s) as directed by provider As Needed for Rhinitis., Disp: , Rfl:     tiZANidine (ZANAFLEX) 2 MG tablet, TAKE 1 TABLET BY MOUTH EVERY 8 HOURS AS NEEDED FOR MUSCLE SPASMS, Disp: 30 tablet, Rfl: 2    Triamcinolone Acetonide 0.05 % ointment, Apply 1 application topically As Needed., Disp: , Rfl:     vitamin B-12 (CYANOCOBALAMIN) 1000 MCG tablet, Take 1 tablet by mouth Daily., Disp: , Rfl:     meloxicam (Mobic) 7.5 MG tablet, Take 2 tablets by mouth Daily. (Patient not taking: Reported on 11/21/2024), Disp: 60 tablet, Rfl: 2

## 2025-02-05 DIAGNOSIS — I10 ESSENTIAL HYPERTENSION: ICD-10-CM

## 2025-02-06 RX ORDER — AMLODIPINE AND VALSARTAN 10; 320 MG/1; MG/1
1 TABLET ORAL DAILY
Qty: 90 TABLET | Refills: 0 | Status: SHIPPED | OUTPATIENT
Start: 2025-02-06

## 2025-02-09 ENCOUNTER — TELEPHONE (OUTPATIENT)
Dept: INTERNAL MEDICINE | Facility: CLINIC | Age: 80
End: 2025-02-09
Payer: COMMERCIAL

## 2025-02-09 DIAGNOSIS — U07.1 COVID: Primary | ICD-10-CM

## 2025-02-09 NOTE — TELEPHONE ENCOUNTER
Patient called the on call service due to testing positive for covid today. His symptoms started 2 days ago. Fever as high as 100.2F, body aches. He has had Paxlovid in the past and is requesting a script.   Discussed possible interaction with amlodipine. He will monitor his BP. He had also done that in the past when on Paxlovid and did okay.   Paxlovid sent to pharmacy

## 2025-05-17 DIAGNOSIS — I10 ESSENTIAL HYPERTENSION: ICD-10-CM

## 2025-05-19 RX ORDER — AMLODIPINE AND VALSARTAN 10; 320 MG/1; MG/1
1 TABLET ORAL DAILY
Qty: 90 TABLET | Refills: 0 | Status: SHIPPED | OUTPATIENT
Start: 2025-05-19

## 2025-05-27 ENCOUNTER — OFFICE VISIT (OUTPATIENT)
Dept: INTERNAL MEDICINE | Facility: CLINIC | Age: 80
End: 2025-05-27
Payer: MEDICARE

## 2025-05-27 VITALS
HEIGHT: 67 IN | TEMPERATURE: 97.5 F | DIASTOLIC BLOOD PRESSURE: 58 MMHG | HEART RATE: 81 BPM | OXYGEN SATURATION: 97 % | WEIGHT: 140 LBS | BODY MASS INDEX: 21.97 KG/M2 | SYSTOLIC BLOOD PRESSURE: 150 MMHG

## 2025-05-27 DIAGNOSIS — I10 ESSENTIAL HYPERTENSION: ICD-10-CM

## 2025-05-27 DIAGNOSIS — Z00.00 WELCOME TO MEDICARE PREVENTIVE VISIT: Primary | ICD-10-CM

## 2025-05-27 DIAGNOSIS — M72.0 DUPUYTREN DISEASE: ICD-10-CM

## 2025-05-27 RX ORDER — SILDENAFIL 100 MG/1
100 TABLET, FILM COATED ORAL AS NEEDED
COMMUNITY
Start: 2025-01-01

## 2025-05-27 NOTE — PROGRESS NOTES
Subjective   The ABCs of the Annual Wellness Visit  Medicare Wellness Visit      Santy Burns is a 80 y.o. patient who presents for a Medicare Wellness Visit.    The following portions of the patient's history were reviewed and   updated as appropriate: allergies, current medications, past family history, past medical history, past social history, past surgical history, and problem list.    Compared to one year ago, the patient's physical   health is the same.  Compared to one year ago, the patient's mental   health is the same.    Recent Hospitalizations:  He was not admitted to the hospital during the last year.     Current Medical Providers:  Patient Care Team:  Jordana Goss APRN as PCP - General (Family Medicine)    Outpatient Medications Prior to Visit   Medication Sig Dispense Refill    amLODIPine-valsartan (EXFORGE)  MG per tablet TAKE 1 TABLET BY MOUTH DAILY 90 tablet 0    Calcium Citrate 250 MG tablet       Fexofenadine HCl (ALLEGRA PO) Take 1 tablet by mouth Daily.      fluticasone (FLONASE) 50 MCG/ACT nasal spray Administer 1 spray into the nostril(s) as directed by provider As Needed for Rhinitis.      multivitamin with minerals (MULTIVITAMIN ADULT PO) Take 1 tablet by mouth Daily.      sildenafil (VIAGRA) 100 MG tablet Take 1 tablet by mouth As Needed.      tiZANidine (ZANAFLEX) 2 MG tablet TAKE 1 TABLET BY MOUTH EVERY 8 HOURS AS NEEDED FOR MUSCLE SPASMS 30 tablet 2    Triamcinolone Acetonide 0.05 % ointment Apply 1 application topically As Needed.      Cholecalciferol 25 MCG (1000 UT) tablet Take 1 tablet by mouth Daily.      meloxicam (Mobic) 7.5 MG tablet Take 2 tablets by mouth Daily. (Patient not taking: Reported on 9/23/2024) 60 tablet 2    Nirmatrelvir & Ritonavir, 300mg/100mg, (PAXLOVID) Take 3 tablets by mouth 2 (Two) Times a Day. 30 tablet 0    vitamin B-12 (CYANOCOBALAMIN) 1000 MCG tablet Take 1 tablet by mouth Daily.       No facility-administered medications prior to visit.  "    No opioid medication identified on active medication list. I have reviewed chart for other potential  high risk medication/s and harmful drug interactions in the elderly.      Aspirin is not on active medication list.  Aspirin use is not indicated based on review of current medical condition/s. Risk of harm outweighs potential benefits.  .    Patient Active Problem List   Diagnosis    Hypertension    Anxiety    Colon polyp    Kidney stones    Elevated fasting glucose    Non-recurrent unilateral inguinal hernia without obstruction or gangrene    Left shoulder tendonitis     Advance Care Planning Advance Directive is not on file.  ACP discussion was held with the patient during this visit. Patient does not have an advance directive, declines further assistance.            Objective   Vitals:    05/27/25 1112   BP: 150/58   BP Location: Left arm   Patient Position: Sitting   Pulse: 81   Temp: 97.5 °F (36.4 °C)   TempSrc: Oral   SpO2: 97%   Weight: 63.5 kg (140 lb)   Height: 170.2 cm (67.01\")   PainSc: 2        Estimated body mass index is 21.92 kg/m² as calculated from the following:    Height as of this encounter: 170.2 cm (67.01\").    Weight as of this encounter: 63.5 kg (140 lb).    BMI is within normal parameters. No other follow-up for BMI required.        Gait and Balance Evaluation:  Normal    Does the patient have evidence of cognitive impairment?  Names, \"absent minded\"  Lab Results   Component Value Date    CHLPL 189 05/20/2025    TRIG 68 05/20/2025    HDL 75 (H) 05/20/2025     (H) 05/20/2025    VLDL 13 05/20/2025    HGBA1C 5.40 05/20/2025                                                                                                Health  Risk Assessment    Smoking Status:  Social History     Tobacco Use   Smoking Status Never   Smokeless Tobacco Never     Alcohol Consumption:  Social History     Substance and Sexual Activity   Alcohol Use Yes    Alcohol/week: 12.0 standard drinks of alcohol    " Types: 12 Drinks containing 0.5 oz of alcohol per week       Fall Risk Screen  STEADI Fall Risk Assessment was completed, and patient is at LOW risk for falls.Assessment completed on:2025    Depression Screening   Little interest or pleasure in doing things? Not at all   Feeling down, depressed, or hopeless? Not at all   PHQ-2 Total Score 0      Health Habits and Functional and Cognitive Screenin/23/2025    11:51 AM   Functional & Cognitive Status   Do you have difficulty preparing food and eating? No   Do you have difficulty bathing yourself, getting dressed or grooming yourself? No   Do you have difficulty using the toilet? No   Do you have difficulty moving around from place to place? No   Do you have trouble with steps or getting out of a bed or a chair? No   Current Diet Well Balanced Diet   Dental Exam Up to date   Eye Exam Up to date   Exercise (times per week) 6 times per week   Current Exercises Include Cardiovascular Workout;Hiking;Stair Step Machine;Treadmill;Walking;Weightlifting   Do you need help using the phone?  No   Are you deaf or do you have serious difficulty hearing?  No   Do you need help to go to places out of walking distance? No   Do you need help shopping? No   Do you need help preparing meals?  No   Do you need help with housework?  No   Do you need help with laundry? No   Do you need help taking your medications? No   Do you need help managing money? No   Do you ever drive or ride in a car without wearing a seat belt? No   Have you felt unusual stress, anger or loneliness in the last month? No   Who do you live with? Spouse   If you need help, do you have trouble finding someone available to you? No   Have you been bothered in the last four weeks by sexual problems? No   Do you have difficulty concentrating, remembering or making decisions? No   Visual Acuity:  Vision Screening    Right eye Left eye Both eyes   Without correction      With correction 2050      Age-appropriate Screening Schedule:  Refer to the list below for future screening recommendations based on patient's age, sex and/or medical conditions. Orders for these recommended tests are listed in the plan section. The patient has been provided with a written plan.    Health Maintenance List  Health Maintenance   Topic Date Due    COVID-19 Vaccine (12 - 2024-25 season) 06/10/2025 (Originally 3/7/2025)    RSV Vaccine - Adults (1 - 1-dose 75+ series) 05/27/2026 (Originally 1/23/2021)    INFLUENZA VACCINE  07/01/2025    ANNUAL WELLNESS VISIT  05/27/2026    TDAP/TD VACCINES (3 - Td or Tdap) 09/30/2028    COLORECTAL CANCER SCREENING  12/23/2029    HEPATITIS C SCREENING  Completed    ZOSTER VACCINE  Completed    Pneumococcal Vaccine 50+  Discontinued                                                                                                                                                CMS Preventative Services Quick Reference  Risk Factors Identified During Encounter  None Identified    The above risks/problems have been discussed with the patient.  Pertinent information has been shared with the patient in the After Visit Summary.  An After Visit Summary and PPPS were made available to the patient.    Follow Up:   Next Medicare Wellness visit to be scheduled in 1 year.         Additional E&M Note during same encounter follows:  Patient has additional, significant, and separately identifiable condition(s)/problem(s) that require work above and beyond the Medicare Wellness Visit     Chief Complaint  Welcome To Medicare    Subjective   HPI  John is also being seen today for additional medical problem/s.  Patient is here to follow up on hypertension which is controlled with current medications. Denies chest pain, headaches.   90s/50s to 120s/60s  He does occasionally feel dizzy    Bilateral shoulder pain.  He saw sports medicine and was treated with meloxicam and physical therapy.  States that he is feeling much  "better    Patient c/o occasional hand pain.  He thinks this may be due to arthritis.  He does have a few nodules in his palms                 Objective   Vital Signs:  /58 (BP Location: Left arm, Patient Position: Sitting)   Pulse 81   Temp 97.5 °F (36.4 °C) (Oral)   Ht 170.2 cm (67.01\")   Wt 63.5 kg (140 lb)   SpO2 97%   BMI 21.92 kg/m²   Physical Exam  Vitals and nursing note reviewed.   Constitutional:       Appearance: Normal appearance. He is well-developed.   Cardiovascular:      Rate and Rhythm: Normal rate and regular rhythm.      Heart sounds: Normal heart sounds.   Pulmonary:      Effort: Pulmonary effort is normal.      Breath sounds: Normal breath sounds.   Musculoskeletal:      Right hand: Deformity (palmar nodules) present.      Left hand: Deformity (palmar nodules) present.   Skin:     General: Skin is warm and dry.   Neurological:      Mental Status: He is alert and oriented to person, place, and time.   Psychiatric:         Speech: Speech normal.         Behavior: Behavior normal.         Thought Content: Thought content normal.         The following data was reviewed by: DORCAS Love on 05/27/2025:    Common labs          11/14/2024    09:49 5/20/2025    10:38   Common Labs   Glucose 98  99    BUN 15  13    Creatinine 0.86  0.97    Sodium 142  142    Potassium 4.4  4.5    Chloride 107  105    Calcium 9.2  9.4    Albumin 4.2  4.3    Total Bilirubin 0.5  0.6    Alkaline Phosphatase 96  98    AST (SGOT) 25  27    ALT (SGPT) 19  23    WBC 5.84  5.01    Hemoglobin 13.9  13.8    Hematocrit 42.4  41.4    Platelets 214  203    Total Cholesterol  189    Triglycerides  68    HDL Cholesterol  75    LDL Cholesterol   101    Hemoglobin A1C 5.50  5.40           Assessment and Plan      Essential hypertension  Hypertension is stable and controlled  Continue current treatment regimen. Patient will cut his medication in 1/2 every other day to prevent hypotension.   Blood pressure will be " reassessed in 6 months.         Welcome to Medicare preventive visit         Dupuytren disease  Monitor for now. May eventually need a referral to hand surgery               Follow Up   No follow-ups on file.  Patient was given instructions and counseling regarding his condition or for health maintenance advice. Please see specific information pulled into the AVS if appropriate.

## 2025-06-25 ENCOUNTER — OFFICE VISIT (OUTPATIENT)
Dept: SPORTS MEDICINE | Facility: CLINIC | Age: 80
End: 2025-06-25
Payer: MEDICARE

## 2025-06-25 VITALS
HEIGHT: 67 IN | OXYGEN SATURATION: 98 % | BODY MASS INDEX: 21.97 KG/M2 | HEART RATE: 80 BPM | DIASTOLIC BLOOD PRESSURE: 72 MMHG | WEIGHT: 140 LBS | TEMPERATURE: 97.2 F | SYSTOLIC BLOOD PRESSURE: 142 MMHG

## 2025-06-25 DIAGNOSIS — M17.12 PRIMARY OSTEOARTHRITIS OF LEFT KNEE: Primary | ICD-10-CM

## 2025-06-25 PROCEDURE — 3078F DIAST BP <80 MM HG: CPT | Performed by: FAMILY MEDICINE

## 2025-06-25 PROCEDURE — 1159F MED LIST DOCD IN RCRD: CPT | Performed by: FAMILY MEDICINE

## 2025-06-25 PROCEDURE — 1160F RVW MEDS BY RX/DR IN RCRD: CPT | Performed by: FAMILY MEDICINE

## 2025-06-25 PROCEDURE — 99213 OFFICE O/P EST LOW 20 MIN: CPT | Performed by: FAMILY MEDICINE

## 2025-06-25 PROCEDURE — 3077F SYST BP >= 140 MM HG: CPT | Performed by: FAMILY MEDICINE

## 2025-06-25 NOTE — PROGRESS NOTES
"Santy is a 80 y.o. year old male     Chief Complaint   Patient presents with   • Left Knee - Follow-up     Patient is here to follow up on his left knee, states there is no pain but is concerned for strength and stability.        History of Present Illness  History of Present Illness      I have reviewed the patient's medical, family, and social history in detail and updated the computerized patient record.    Review of Systems    /72 (BP Location: Right arm, Patient Position: Sitting, Cuff Size: Adult)   Pulse 80   Temp 97.2 °F (36.2 °C) (Temporal)   Ht 170.2 cm (67.01\")   Wt 63.5 kg (140 lb)   SpO2 98%   BMI 21.92 kg/m²      Physical Exam    Vital signs reviewed.   General: No acute distress.      Physical Exam      Results  Results      Procedures     Diagnoses and all orders for this visit:    Primary osteoarthritis of left knee  -     Ambulatory Referral to Physical Therapy for Evaluation & Treatment      Assessment & Plan      Patient or patient representative verbalized consent for the use of Ambient Listening during the visit with  Jay Feldman MD for chart documentation. 11:38 EDT 06/25/25    *Dictated after leaving exam room.     Jay Feldman MD   11:38 EDT   06/25/25     "

## 2025-06-26 NOTE — PROGRESS NOTES
"Santy is a 80 y.o. year old male     Chief Complaint   Patient presents with    Left Knee - Follow-up     Patient is here to follow up on his left knee, states there is no pain but is concerned for strength and stability.        History of Present Illness  HPI     Here today to follow-up on left knee arthritis.  Overall he does not have very much pain but does have some complaints of feeling unstable on the knee at times particularly with higher levels of physical activity.  Feels like his knee is getting weak.    Review of Systems    /72 (BP Location: Right arm, Patient Position: Sitting, Cuff Size: Adult)   Pulse 80   Temp 97.2 °F (36.2 °C) (Temporal)   Ht 170.2 cm (67.01\")   Wt 63.5 kg (140 lb)   SpO2 98%   BMI 21.92 kg/m²      Physical Exam    Vital signs reviewed.   General: No acute distress.      [facility] Exam:  Ortho Exam  Left knee: Normal appearance.  No effusion.  There is very mild medial joint line tenderness to palpation.  There is mild pseudolaxity noted.    Procedures     Diagnoses and all orders for this visit:    Primary osteoarthritis of left knee  -     Ambulatory Referral to Physical Therapy for Evaluation & Treatment    Discussed ongoing management of his knee arthritis.  I agree with him that stability based physical therapy would be ideal to optimize his strength and dynamic stabilization.  He can continue a basic compression sleeve and we could use a more aggressive brace if needed.  Also discussed consideration of treating the arthritis more aggressively despite his complaints of pain as he could be getting some secondary signals that may respond favorably to injection therapy.      Please note that portions of this note were completed with a voice recognition program.     "

## 2025-08-01 ENCOUNTER — TREATMENT (OUTPATIENT)
Age: 80
End: 2025-08-01
Payer: MEDICARE

## 2025-08-01 DIAGNOSIS — G89.29 CHRONIC PAIN OF LEFT KNEE: ICD-10-CM

## 2025-08-01 DIAGNOSIS — M25.562 CHRONIC PAIN OF LEFT KNEE: ICD-10-CM

## 2025-08-01 DIAGNOSIS — R29.898 IMPAIRED FLEXIBILITY OF LOWER EXTREMITY: ICD-10-CM

## 2025-08-01 DIAGNOSIS — M17.12 PRIMARY OSTEOARTHRITIS OF LEFT KNEE: Primary | ICD-10-CM

## 2025-08-01 NOTE — PROGRESS NOTES
"Physical Therapy Initial Evaluation and Plan of Care  [facility]   [address] [place] [address]  P: [phone]       F: [phone]       Patient: Santy Burns   : 1945  Visit Diagnoses:     ICD-10-CM ICD-9-CM   1. Primary osteoarthritis of left knee  M17.12 715.16   2. Chronic pain of left knee  M25.562 719.46    G89.29 338.29   3. Impaired flexibility of lower extremity  R29.898 781.99     Referring practitioner: Jay Feldman MD  Date of Initial Visit: 2025  Today's Date: 2025  Patient seen for 1 sessions           Subjective Questionnaire: WOMAC:       Subjective Evaluation    History of Present Illness  Date of onset: 2025  Mechanism of injury: Patient reports knee pain since -meniscus tear.  States over the past year his knee has started bothering him.  Has been becoming weaker and feeling unstable.  Feels the weakness a lot with going down stairs-has to rely on HR going down stairs.  States knee feels \"loose\", feels a shifting in knee.      Over the past 5 years he as replaced running with walking.  Stays active with exercise, hiking-does not like going down hill.  Over the past few months he has been wearing a soft knee sleeve when walking and he feels better using that.      Subjective comment: Patient reports knee weakness and instability.  Patient Occupation: Retired professor Pain  Quality: discomfort  Aggravating factors: stairs and ambulation (down hill walking/hiking)  Progression: worsening    Social Support  Lives in: multiple-level home  Lives with: spouse    Hand dominance: right    Diagnostic Tests  X-ray: abnormal (arthritis, narrowing joint space)    Patient Goals  Patient goals for therapy: increased strength  Patient goal: improve walking and stairs, improved stability on left knee       Past Medical History:   Diagnosis Date    1960    Seasonal    Anxiety     Not currently a problem    " "Cataract 2017    Surgery  July 2023    Colon polyp     None on last several colonoscopies    Diverticulitis of colon 2006    mild    Diverticulosis 2006    Moderate    Hernia     HL (hearing loss)     Hearing aids    Hypertension     well controlled at home/\"white coat\" syndrome at doc office    Kidney stones     Shoulder injury     Visual impairment     Glasses/cataract surgery     Past Surgical History:   Procedure Laterality Date    ADENOIDECTOMY  1956    COLONOSCOPY N/A 2019    Dr. Jefferson    EYE SURGERY  July, 2023    Catract    INGUINAL HERNIA REPAIR Right 12/06/2022    Procedure: LAPAROSCOPIC RIGHT INGUINAL HERNIA REPAIR WITH MESH;  Surgeon: Klarissa Gipson MD;  Location: [facility] MAIN OR;  Service: General;  Laterality: Right;    INGUINAL HERNIA REPAIR  2022    KIDNEY STONE SURGERY      multiple    KNEE ARTHROSCOPY W/ MENISCAL REPAIR Left     KNEE SURGERY  1964    Medial meniscectomy, left knee    MENISCECTOMY Left 1964    SINUS SURGERY  2017    TONSILLECTOMY  1956    VASECTOMY  1970         Objective          Tenderness     Additional Tenderness Details  No TTP    Active Range of Motion   Left Knee   Normal active range of motion    Patellar Mobility   Left Knee Patellar tendons within functional limits include the medial, lateral, superior and inferior.     Strength/Myotome Testing     Left Knee   Normal strength  Left knee quadriceps contraction strength: Decreased VMO engagement.    Right Knee   Normal strength    Left Knee Flexibility Comments:   SLR: 50 degrees    Gross hip girdle/LE muscle flexibility    Right Knee Flexibility Comments:   SLR: 55 degrees    Gross hip girdle/LE muscle flexibility    Functional Assessment   Squat   Pain and sitting toward right side.     Single Leg Stance   Left: 30 (LE shaking, abducted arms & right leg) seconds  Right: 30 (leg shaking, increased ankle eversion corrections) seconds          Assessment & Plan       Assessment  Impairments: abnormal gait, abnormal or " restricted ROM, activity intolerance, impaired physical strength, pain with function and weight-bearing intolerance   Functional limitations: walking, uncomfortable because of pain and standing   Assessment details: Santy Burns is a pleasant 80 y.o. male that presents with current left knee pain, muscle imbalance with functional movement pattern asymmetry, decreased quadricep/VMO engagement, decreased hip girdle/LE muscle flexibility, impaired balance, symptom limited functional activity tolerance.  Pt will benefit from skilled PT services in order to address listed impairments, decrease pain and restore function.    Prognosis: good  Prognosis details: Patient demonstrates good rehab potential as evidenced by high motivation to participate with PT POC and to return to PLOF/ADLs/IADLs.    Goals  Plan Goals: Short Term Goals (3 wks):  1.  Patient will have increase VMO engagement to WNL.  2.  Patient will have increased SLR to 70 degrees.  3.  Patient will demonstrate symmetrical movement with functional squat patterns.  4.  Patient will have increased ability with SLS to WNL.      Long Term Goals (6 wks):  1.  Patient will be independent in performance of HEP for carryover upon discharge from skilled PT services.  2.  Patient will increase hip girdle/LE muscle flexibility to WFL.  3.  Patient will have improved WOMAC Score of 10/96 or better.  4.  Patient will report improved confidence and sense of stability with navigating stairs and inclines.      Plan  Therapy options: will be seen for skilled therapy services  Planned modality interventions: thermotherapy (hydrocollator packs) and cryotherapy  Planned therapy interventions: manual therapy, soft tissue mobilization, strengthening, stretching, balance/weight-bearing training, flexibility, functional ROM exercises, gait training, joint mobilization, home exercise program, neuromuscular re-education, therapeutic activities and abdominal trunk  stabilization  Frequency: 1x week  Duration in weeks: 6  Treatment plan discussed with: patient  Plan details: Pt was educated on the importance of their HEP and their current need for continued skilled physical therapy. Patients goals and potential limitations were discussed and pt is in agreement with current plan of care and treatment emphasis.                History # of Personal Factors and/or Comorbidities: HIGH (3+)  Examination of Body System(s): # of elements: MODERATE (3)  Clinical Presentation: STABLE   Clinical Decision Making: MODERATE      Timed:         Manual Therapy:         mins  12689;     Therapeutic Exercise:    15     mins  83405;     Neuromuscular Nam:        mins  14634;    Therapeutic Activity:     10     mins  96407;     Gait Training:           mins  98742;     Ultrasound:          mins  15757;    Ionto                                  mins  04673  Self Care                            mins  47719  Orthotic MGMT/Train         mins  51716    Un-Timed:  Electrical Stimulation:         mins  08596 ( );  Dry Needling:          mins  04922 self-pay;  Dry Needling:          mins  35551 self-pay  Traction          mins  17115  Canalith Repos         mins  37669  Low Eval          mins  63422  Mod Eval     30     mins  87577  High Eval                            mins  10360    Timed Treatment:   25   mins   Total Treatment:     55   mins      PT SIGNATURE: Sara Aguayo PT     License Number: [id number]  Electronically signed by Sara Aguayo PT, 08/01/25, 10:05 AM EDT      DATE TREATMENT INITIATED: 8/1/2025    Initial Certification  Certification Period: 8/1/2025 thru 10/29/2025  I certify that the therapy services are furnished while this patient is under my care.  The services outlined above are required by this patient, and will be reviewed every 90 days.     PHYSICIAN: Jay Feldman MD      NPI: [id number]  DATE:         Please sign and return via fax to [phone]. Thank you,  [facility].

## 2025-08-01 NOTE — PATIENT INSTRUCTIONS
Access Code: [email or web address]  URL: [email or web address]  Date: 08/01/2025  Prepared by: Sara Aguayo    Exercises  - Supine Hip Adduction Isometric with Ball  - 1 x daily - 7 x weekly - 1 sets - 10 reps - 5 sec. hold  - Supine Straight Leg Hip Adduction and Quad Set with Ball  - 1 x daily - 7 x weekly - 1 sets - 10 reps - 5 sec. hold  - Supine Quad Set  - 1 x daily - 7 x weekly - 1 sets - 10 reps - 5-10 sec. hold  - Supine Hamstring Stretch with Strap  - 1 x daily - 7 x weekly - 1 sets - 3 reps - 20-30 sec. hold  - Supine ITB Stretch with Strap  - 1 x daily - 7 x weekly - 1 sets - 3 reps - 20-30 sec. hold  - Hip Adductors and Hamstring Stretch with Strap  - 1 x daily - 7 x weekly - 1 sets - 3 reps - 20-30 sec. hold  - Long Sitting Straight Leg Raise with External Rotation (Mirrored)  - 1 x daily - 7 x weekly - 3 sets - 10 reps

## 2025-08-13 DIAGNOSIS — I10 ESSENTIAL HYPERTENSION: ICD-10-CM

## 2025-08-13 RX ORDER — AMLODIPINE AND VALSARTAN 10; 320 MG/1; MG/1
1 TABLET ORAL DAILY
Qty: 90 TABLET | Refills: 0 | Status: SHIPPED | OUTPATIENT
Start: 2025-08-13

## 2025-08-20 ENCOUNTER — TREATMENT (OUTPATIENT)
Age: 80
End: 2025-08-20
Payer: MEDICARE

## 2025-08-20 DIAGNOSIS — M17.12 PRIMARY OSTEOARTHRITIS OF LEFT KNEE: Primary | ICD-10-CM

## 2025-08-20 DIAGNOSIS — R29.898 IMPAIRED FLEXIBILITY OF LOWER EXTREMITY: ICD-10-CM

## 2025-08-20 DIAGNOSIS — G89.29 CHRONIC PAIN OF LEFT KNEE: ICD-10-CM

## 2025-08-20 DIAGNOSIS — M25.562 CHRONIC PAIN OF LEFT KNEE: ICD-10-CM

## 2025-08-28 ENCOUNTER — TREATMENT (OUTPATIENT)
Age: 80
End: 2025-08-28
Payer: MEDICARE

## 2025-08-28 DIAGNOSIS — G89.29 CHRONIC PAIN OF LEFT KNEE: ICD-10-CM

## 2025-08-28 DIAGNOSIS — R29.898 IMPAIRED FLEXIBILITY OF LOWER EXTREMITY: ICD-10-CM

## 2025-08-28 DIAGNOSIS — M17.12 PRIMARY OSTEOARTHRITIS OF LEFT KNEE: Primary | ICD-10-CM

## 2025-08-28 DIAGNOSIS — M25.562 CHRONIC PAIN OF LEFT KNEE: ICD-10-CM

## (undated) DEVICE — LAPAROVUE VISIBILITY SYSTEM LAPAROSCOPIC SOLUTIONS: Brand: LAPAROVUE

## (undated) DEVICE — ADHS SKIN SURG TISS VISC PREMIERPRO EXOFIN HI/VISC FAST/DRY

## (undated) DEVICE — ENDOPATH XCEL BLADELESS TROCARS WITH STABILITY SLEEVES: Brand: ENDOPATH XCEL

## (undated) DEVICE — DISPOSABLE MONOPOLAR ENDOSCOPIC CORD 10 FT. (3M): Brand: KIRWAN

## (undated) DEVICE — LOU LAP CHOLE: Brand: MEDLINE INDUSTRIES, INC.

## (undated) DEVICE — ENDOPATH XCEL UNIVERSAL TROCAR STABLILITY SLEEVES: Brand: ENDOPATH XCEL

## (undated) DEVICE — PATIENT RETURN ELECTRODE, SINGLE-USE, CONTACT QUALITY MONITORING, ADULT, WITH 9FT CORD, FOR PATIENTS WEIGING OVER 33LBS. (15KG): Brand: MEGADYNE

## (undated) DEVICE — GOWN ,SIRUS,NONREINFORCED SMALL: Brand: MEDLINE

## (undated) DEVICE — SKIN PREP TRAY W/CHG: Brand: MEDLINE INDUSTRIES, INC.

## (undated) DEVICE — GLV SURG PREMIERPRO ORTHO LTX PF SZ7.5 BRN

## (undated) DEVICE — GLV SURG BIOGEL LTX PF 6

## (undated) DEVICE — SUT VIC 5/0 PS2 18IN J495H

## (undated) DEVICE — ENDOCUT SCISSOR TIP, DISPOSABLE: Brand: RENEW

## (undated) DEVICE — SUT VIC 0 TN 27IN DYED JTN0G

## (undated) DEVICE — ENDOPATH PNEUMONEEDLE INSUFFLATION NEEDLES WITH LUER LOCK CONNECTORS 120MM: Brand: ENDOPATH

## (undated) DEVICE — LAPAROSCOPIC SMOKE ELIMINATION DEVICE: Brand: PNEUVIEW XE